# Patient Record
Sex: FEMALE | Race: WHITE | NOT HISPANIC OR LATINO | Employment: FULL TIME | URBAN - METROPOLITAN AREA
[De-identification: names, ages, dates, MRNs, and addresses within clinical notes are randomized per-mention and may not be internally consistent; named-entity substitution may affect disease eponyms.]

---

## 2017-08-08 ENCOUNTER — HOSPITAL ENCOUNTER (OUTPATIENT)
Dept: MAMMOGRAPHY | Facility: CLINIC | Age: 47
Discharge: HOME | End: 2017-08-08
Attending: OBSTETRICS & GYNECOLOGY | Admitting: OBSTETRICS & GYNECOLOGY
Payer: COMMERCIAL

## 2017-08-08 DIAGNOSIS — Z12.31 VISIT FOR SCREENING MAMMOGRAM: ICD-10-CM

## 2017-08-08 PROCEDURE — G0202 SCR MAMMO BI INCL CAD: HCPCS

## 2017-08-09 ENCOUNTER — HOSPITAL ENCOUNTER (OUTPATIENT)
Dept: ULTRASOUND IMAGING | Facility: CLINIC | Age: 47
End: 2017-08-09
Attending: OBSTETRICS & GYNECOLOGY
Payer: COMMERCIAL

## 2017-08-09 ENCOUNTER — HOSPITAL ENCOUNTER (OUTPATIENT)
Dept: MAMMOGRAPHY | Facility: CLINIC | Age: 47
Discharge: HOME | End: 2017-08-09
Attending: OBSTETRICS & GYNECOLOGY | Admitting: OBSTETRICS & GYNECOLOGY
Payer: COMMERCIAL

## 2017-08-09 DIAGNOSIS — R92.8 ABNORMAL MAMMOGRAM: ICD-10-CM

## 2017-08-09 PROCEDURE — G0279 TOMOSYNTHESIS, MAMMO: HCPCS

## 2017-08-09 PROCEDURE — 76642 ULTRASOUND BREAST LIMITED: CPT | Mod: LT

## 2017-12-23 ENCOUNTER — HOSPITAL ENCOUNTER (EMERGENCY)
Facility: CLINIC | Age: 47
Discharge: HOME | End: 2017-12-23
Attending: EMERGENCY MEDICINE | Admitting: EMERGENCY MEDICINE
Payer: COMMERCIAL

## 2017-12-23 ENCOUNTER — APPOINTMENT (OUTPATIENT)
Dept: GENERAL RADIOLOGY | Facility: CLINIC | Age: 47
End: 2017-12-23
Attending: EMERGENCY MEDICINE
Payer: COMMERCIAL

## 2017-12-23 VITALS
DIASTOLIC BLOOD PRESSURE: 65 MMHG | RESPIRATION RATE: 18 BRPM | OXYGEN SATURATION: 92 % | HEART RATE: 102 BPM | HEIGHT: 62 IN | BODY MASS INDEX: 29.44 KG/M2 | TEMPERATURE: 99.8 F | WEIGHT: 160 LBS | SYSTOLIC BLOOD PRESSURE: 100 MMHG

## 2017-12-23 DIAGNOSIS — R50.9 FEBRILE ILLNESS: ICD-10-CM

## 2017-12-23 DIAGNOSIS — J40 BRONCHITIS: ICD-10-CM

## 2017-12-23 LAB
ANION GAP SERPL CALCULATED.3IONS-SCNC: 11 MMOL/L (ref 3–14)
BUN SERPL-MCNC: 11 MG/DL (ref 7–30)
CALCIUM SERPL-MCNC: 8.5 MG/DL (ref 8.5–10.1)
CHLORIDE SERPL-SCNC: 104 MMOL/L (ref 94–109)
CO2 SERPL-SCNC: 22 MMOL/L (ref 20–32)
CREAT SERPL-MCNC: 0.82 MG/DL (ref 0.52–1.04)
ERYTHROCYTE [DISTWIDTH] IN BLOOD BY AUTOMATED COUNT: 12.4 % (ref 10–15)
GFR SERPL CREATININE-BSD FRML MDRD: 75 ML/MIN/1.7M2
GLUCOSE SERPL-MCNC: 105 MG/DL (ref 70–99)
HCT VFR BLD AUTO: 39.9 % (ref 35–47)
HGB BLD-MCNC: 13.4 G/DL (ref 11.7–15.7)
MCH RBC QN AUTO: 29.8 PG (ref 26.5–33)
MCHC RBC AUTO-ENTMCNC: 33.6 G/DL (ref 31.5–36.5)
MCV RBC AUTO: 89 FL (ref 78–100)
PLATELET # BLD AUTO: 204 10E9/L (ref 150–450)
POTASSIUM SERPL-SCNC: 3.4 MMOL/L (ref 3.4–5.3)
RBC # BLD AUTO: 4.5 10E12/L (ref 3.8–5.2)
SODIUM SERPL-SCNC: 137 MMOL/L (ref 133–144)
WBC # BLD AUTO: 7.4 10E9/L (ref 4–11)

## 2017-12-23 PROCEDURE — 25000132 ZZH RX MED GY IP 250 OP 250 PS 637: Performed by: EMERGENCY MEDICINE

## 2017-12-23 PROCEDURE — 85027 COMPLETE CBC AUTOMATED: CPT | Performed by: EMERGENCY MEDICINE

## 2017-12-23 PROCEDURE — 99284 EMERGENCY DEPT VISIT MOD MDM: CPT | Mod: 25

## 2017-12-23 PROCEDURE — 80048 BASIC METABOLIC PNL TOTAL CA: CPT | Performed by: EMERGENCY MEDICINE

## 2017-12-23 PROCEDURE — 71020 XR CHEST 2 VW: CPT

## 2017-12-23 RX ORDER — CETIRIZINE HYDROCHLORIDE 10 MG/1
10 TABLET ORAL EVERY MORNING
COMMUNITY

## 2017-12-23 RX ORDER — AZITHROMYCIN 250 MG/1
TABLET, FILM COATED ORAL
Qty: 6 TABLET | Refills: 0 | Status: SHIPPED | OUTPATIENT
Start: 2017-12-23 | End: 2017-12-28

## 2017-12-23 RX ORDER — IBUPROFEN 600 MG/1
600 TABLET, FILM COATED ORAL ONCE
Status: COMPLETED | OUTPATIENT
Start: 2017-12-23 | End: 2017-12-23

## 2017-12-23 RX ADMIN — IBUPROFEN 600 MG: 600 TABLET ORAL at 14:12

## 2017-12-23 ASSESSMENT — ENCOUNTER SYMPTOMS
SINUS PAIN: 1
FEVER: 1
COUGH: 1
SINUS PRESSURE: 1
FATIGUE: 1
VOMITING: 0
DIARRHEA: 0

## 2017-12-23 NOTE — ED AVS SNAPSHOT
[facility] Emergency Department    [address]    [facility] [address]    Phone:  [phone]    Fax:  [phone]                                       January Odonnell   MRN: [id number]    Department:  [facility] Emergency Department   Date of Visit:  12/23/2017           After Visit Summary Signature Page     I have received my discharge instructions, and my questions have been answered. I have discussed any challenges I see with this plan with the nurse or doctor.    ..........................................................................................................................................  Patient/Patient Representative Signature      ..........................................................................................................................................  Patient Representative Print Name and Relationship to Patient    ..................................................               ................................................  Date                                            Time    ..........................................................................................................................................  Reviewed by Signature/Title    ...................................................              ..............................................  Date                                                            Time

## 2017-12-23 NOTE — ED NOTES
Pt presents to ED with fever and cough. Had been seen in the beginning of December for cough and was being treated with prednisone and nebulizer. Went online to [employer] this AM and was prescribed mucinex, cefnidir, and tessalon pearls. She states she is concerned because the fever continues to increase. Taking ibuprofen and tylenol. Last tylenol 1 hr ago.

## 2017-12-23 NOTE — ED PROVIDER NOTES
History     Chief Complaint:    Fever and Cough      HPI   January Odonnell is a 47 year old female who presents to the ED today with a fever and a cough. The patient states that she was seen in Urgent Care about 2 weeks ago with a bad cough. She was diagnosed with acute bronchitis at the time and was prescribed Prednisone. She states that a few days later, she went in to see her primary care physician since she was not feeling any better and had some difficulty breathing. She was prescribed a nebulizer treatment, which seemed to help her symptoms. She states that earlier this week, she started to feel worse, with feeling more fatigued and noticed that her fever was high. She states that her highest noted fever was about 104.4. She reports to some sinus pressure, green and yellow colored mucus as well as some pressure in her ears, but denies any rashes, vomiting, diarrhea, or ear pain. 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 She reports that she had her flu shot earlier this year. The patient denies any history of lung problems.  She is a non-smoker. She reports that she has been taking Tylenol for her fevers, with her last dose being about an hour ago.     Allergies:  Amoxicillin   Ampicillin   Penicillins   Tetracycline      Medications:    Mucinex   Vermayst   Zyrtec     Past Medical History:    Allergic state     Past Surgical History:    History reviewed. No pertinent past surgical history.     Family History:    History reviewed. No pertinent family history.     Social History:  Marital Status:    Presents to the ED with    Tobacco Use: never smoker   Alcohol Use: yes   PCP: Frieda Saldaña     Review of Systems   Constitutional: Positive for fatigue and fever.   HENT: Positive for sinus pain and sinus pressure. Negative for ear pain.    Respiratory: Positive for cough.    Gastrointestinal: Negative for diarrhea and  "vomiting.   Skin: Negative for rash.   All other systems reviewed and are negative.      Physical Exam   First Vitals:  BP: (!) 119/96  Pulse: 102  Heart Rate: 102  Temp: 99.8  F (37.7  C)  Resp: 18  Height: 157.5 cm (5' 2\")  Weight: 72.6 kg (160 lb)  SpO2: 93 %      Physical Exam  General:                        Well-nourished                        Speaking in full sentences                        Intermittent dry, non-productive cough  Eyes:                        Conjunctiva without injection or scleral icterus                        PERRL  ENT:                        Moist mucous membranes                        Posterior oropharynx clear without erythema or exudate                        Nares patent                        Pinnae normal  Neck:                        Full ROM                        No stiffness appreciated  Resp:                        Lungs CTAB                        No crackles, wheezing or audible rubs                        Good air movement  CV:                                        Tachycardic rate, regular rhythm                        S1 and S2 present                        No murmur, gallop or rub  GI:                        BS present                        Abdomen soft without distention                        Non-tender to light and deep palpation                        No guarding or rebound tenderness  Skin:                        Warm, dry, well perfused                        No rashes or open wounds on exposed skin  MSK:                        Moves all extremities                        No focal deformities or swelling  Neuro:                        Alert                        Answers questions appropriately                        Moves all extremities equally                        Gait stable  Psych:                        Normal affect, normal mood    Emergency Department Course   Imaging:  Xray chest, PA and LAT  Normal.   Report per radiology.   Radiographic findings " were communicated with the patient who voiced understanding of the findings.    Laboratory:  CBC:  WBC 7.4, HGB 13.4, , otherwise WNL   BMP: glucose 105 (H), otherwise WNL (Creatinine 0.82)     Interventions:  (1412) Ibuprofen 600 mg, PO     Emergency Department Course:  Nursing notes and vitals reviewed.  (8642) I performed an exam of the patient as documented above.    The patient was sent for a chest xray while in the emergency department, findings above.    A peripheral IV was established. Blood was drawn from the patient. This was sent for laboratory testing, findings above.    (4392) I rechecked on the patient and updated them on results. The patient was feeling better at this time.    Findings and plan explained to the patient. Patient discharged home with instructions regarding supportive care, medications, and reasons to return. The importance of close follow-up was reviewed. The patient was prescribed azithromycin.   I personally reviewed the laboratory results with the patient and answered all related questions prior to discharge.     Impression & Plan    Medical Decision Making:  January Odonnell is a 47 year old female, previously healthy, presenting to the ED today for evaluation of fever and cough. Prior records are reviewed including cough and URI symptoms since earlier this month. Workup included imaging and laboratory studies. At present, symptoms are most suspicious for bronchitis. Chest xray was performed which was negative for acute infiltrate and pulmonary exam was clear. Given the duration of symptoms and development of fever and productive sputum, I have elected to cover with antibiotic at this time with concern for superimposed bacterial process. She has no history if underlying immunocompromising conditions, or known pulmonary disease including COPD. I have considered influenza, although given the duration of symptoms, she is outside the treatment window. Treatment at this point is  otherwise supportive and the patient is otherwise well appearing. This was discussed with the patient at length. I considered other bacterial infection such as meningitis, although clinically feel that this is unlikely. She is of normal mental status, demonstrates full range of motion around the neck and is otherwise nontoxic in appearance. After ibuprofen, she was noting improvement in symptoms. PE considered though felt unlikely given clear infectious symptoms of cough productive of sputum as well as sinus drainage. Results of the above studies were discussed with patient. CBC and BMP were unremarkable. Patient is felt stable for discharge home with close outpatient follow up and was recommended rest, fluids, tylenol and ibuprofen. Will start azithromycin for possible bacterial bronchitis. Return to ED with worsening shortness of breath, cough, or other concerning symptoms. All questions answered prior to discharge.     Diagnosis:    ICD-10-CM    1. Bronchitis J40    2. Febrile illness R50.9        Disposition:  discharged to home    Discharge Medications:  New Prescriptions    AZITHROMYCIN (ZITHROMAX Z-LUPILLO) 250 MG TABLET    Two tablets on the first day, then one tablet daily for the next 4 days         IBelle, am serving as a scribe on 12/23/2017 at 1:55 PM to personally document services performed by Dr. Peters based on my observations and the provider's statements to me.    12/23/2017   [facility] EMERGENCY DEPARTMENT       Tejas Peters MD  12/23/17 9585

## 2017-12-23 NOTE — ED AVS SNAPSHOT
[facility] Emergency Department    [address] [facility] [address]    Phone:  [phone]    Fax:  [phone]                                       January Odonnell   MRN: [id number]    Department:  [facility] Emergency Department   Date of Visit:  12/23/2017           Patient Information     Date Of Birth          1970        Your diagnoses for this visit were:     Bronchitis     Febrile illness        You were seen by Tejas Peters MD.      Follow-up Information     Follow up with Frieda Saldaña MD. Schedule an appointment as soon as possible for a visit in 2 days.    Specialty:  OB/Gyn    Contact information:    [facility]  [address]   [facility] [address] [phone]          Follow up with [facility] Emergency Department.    Specialty:  EMERGENCY MEDICINE    Why:  If symptoms worsen    Contact information:    [address] [facility] [address] [phone]        Discharge Instructions       Discharge Instructions  Bronchitis, Pneumonia, Bronchospasm    You were seen today for a chest infection or inflammation. If your provider decided this was due to a bacterial infection, you may need an antibiotic. Sometimes these are caused by a virus, and then an antibiotic will not help.     Generally, every Emergency Department visit should have a follow-up clinic visit with either a primary or a specialty clinic/provider. Please follow-up as instructed by your emergency provider today.    Return to the Emergency Department if:    Your breathing gets much worse.    You are very weak, or feel much more ill.    You develop new symptoms, such as chest pain.    You cough up blood.    You are vomiting (throwing up) enough that you cannot keep fluids or your medicine down.    What can I do to help myself?    Fill any prescriptions the provider gave you and take them right away--especially antibiotics. Be sure to finish  the whole antibiotic prescription.    You may be given a prescription for an inhaler, which can help loosen tight air passages.  Use this as needed, but not more often than directed. Inhalers work much better when used with a spacer.     You may be given a prescription for a steroid to reduce inflammation. Used long-term, these can have side effects, but for short-term use they are safe. You may notice restlessness or increased appetite.        You may use non-prescription cough or cold medicines. Cough medicines may help, but don t make the cough go away completely.     Avoid smoke, because this can make your symptoms worse. If you smoke, this may be a good time to quit! Consider using nicotine lozenges, gum, or patches to reduce cravings.     If you have a fever, Tylenol  (acetaminophen), Motrin  (ibuprofen), or Advil  (ibuprofen) may help bring fever down and may help you feel more comfortable. Be sure to read and follow the package directions, and ask your provider if you have questions.    Be sure to get your flu shot each year.  For certain ages, the pneumonia shot can help prevent pneumonia.  If you were given a prescription for medicine here today, be sure to read all of the information (including the package insert) that comes with your prescription.  This will include important information about the medicine, its side effects, and any warnings that you need to know about.  The pharmacist who fills the prescription can provide more information and answer questions you may have about the medicine.  If you have questions or concerns that the pharmacist cannot address, please call or return to the Emergency Department.     Remember that you can always come back to the Emergency Department if you are not able to see your regular provider in the amount of time listed above, if you get any new symptoms, or if there is anything that worries you.      24 Hour Appointment Hotline       To make an appointment at any  [facility], call [phone] ([phone]). If you don't have a family doctor or clinic, we will help you find one. [place] clinics are conveniently located to serve the needs of you and your family.             Review of your medicines      START taking        Dose / Directions Last dose taken    azithromycin 250 MG tablet   Commonly known as:  ZITHROMAX Z-LUPILLO   Quantity:  6 tablet        Two tablets on the first day, then one tablet daily for the next 4 days   Refills:  0          Our records show that you are taking the medicines listed below. If these are incorrect, please call your family doctor or clinic.        Dose / Directions Last dose taken    cetirizine 10 MG tablet   Commonly known as:  zyrTEC   Dose:  10 mg        Take 10 mg by mouth daily   Refills:  0        dextromethorphan-guaiFENesin  MG per 12 hr tablet   Commonly known as:  MUCINEX DM   Dose:  1 tablet        Take 1 tablet by mouth every 12 hours   Refills:  0        fluticasone 27.5 MCG/SPRAY spray   Commonly known as:  VERAMYST   Dose:  2 spray        Spray 2 sprays into both nostrils daily   Refills:  0                Prescriptions were sent or printed at these locations (1 Prescription)                   Other Prescriptions                Printed at Department/Unit printer (1 of 1)         azithromycin (ZITHROMAX Z-LUPILLO) 250 MG tablet                Procedures and tests performed during your visit     Basic metabolic panel (BMP)    CBC (platelets, no diff)    Chest XR,  PA & LAT      Orders Needing Specimen Collection     None      Pending Results     No orders found from 12/21/2017 to 12/24/2017.            Pending Culture Results     No orders found from 12/21/2017 to 12/24/2017.            Pending Results Instructions     If you had any lab results that were not finalized at the time of your Discharge, you can call the ED Lab Result RN at [phone]. You will be contacted by this team for any positive Lab results or  changes in treatment. The nurses are available 7 days a week from 10A to 6:30P.  You can leave a message 24 hours per day and they will return your call.        Test Results From Your Hospital Stay        12/23/2017  2:17 PM      Component Results     Component Value Ref Range & Units Status    WBC 7.4 4.0 - 11.0 10e9/L Final    RBC Count 4.50 3.8 - 5.2 10e12/L Final    Hemoglobin 13.4 11.7 - 15.7 g/dL Final    Hematocrit 39.9 35.0 - 47.0 % Final    MCV 89 78 - 100 fl Final    MCH 29.8 26.5 - 33.0 pg Final    MCHC 33.6 31.5 - 36.5 g/dL Final    RDW 12.4 10.0 - 15.0 % Final    Platelet Count 204 150 - 450 10e9/L Final         12/23/2017  2:33 PM      Component Results     Component Value Ref Range & Units Status    Sodium 137 133 - 144 mmol/L Final    Potassium 3.4 3.4 - 5.3 mmol/L Final    Chloride 104 94 - 109 mmol/L Final    Carbon Dioxide 22 20 - 32 mmol/L Final    Anion Gap 11 3 - 14 mmol/L Final    Glucose 105 (H) 70 - 99 mg/dL Final    Urea Nitrogen 11 7 - 30 mg/dL Final    Creatinine 0.82 0.52 - 1.04 mg/dL Final    GFR Estimate 75 >60 mL/min/1.7m2 Final    Non  GFR Calc    GFR Estimate If Black >90 >60 mL/min/1.7m2 Final    African American GFR Calc    Calcium 8.5 8.5 - 10.1 mg/dL Final         12/23/2017  2:41 PM      Narrative     CHEST TWO VIEWS  12/23/2017 2:35 PM     HISTORY: Cough and fever.    COMPARISON: None.        Impression     IMPRESSION: Normal.    BUD AMBROSIO MD                Clinical Quality Measure: Blood Pressure Screening     Your blood pressure was checked while you were in the emergency department today. The last reading we obtained was  BP: (!) 119/96 . Please read the guidelines below about what these numbers mean and what you should do about them.  If your systolic blood pressure (the top number) is less than 120 and your diastolic blood pressure (the bottom number) is less than 80, then your blood pressure is normal. There is nothing more that you need to do about  "it.  If your systolic blood pressure (the top number) is 120-139 or your diastolic blood pressure (the bottom number) is 80-89, your blood pressure may be higher than it should be. You should have your blood pressure rechecked within a year by a primary care provider.  If your systolic blood pressure (the top number) is 140 or greater or your diastolic blood pressure (the bottom number) is 90 or greater, you may have high blood pressure. High blood pressure is treatable, but if left untreated over time it can put you at risk for heart attack, stroke, or kidney failure. You should have your blood pressure rechecked by a primary care provider within the next 4 weeks.  If your provider in the emergency department today gave you specific instructions to follow-up with your doctor or provider even sooner than that, you should follow that instruction and not wait for up to 4 weeks for your follow-up visit.        Thank you for choosing [facility]       Thank you for choosing [facility] for your care. Our goal is always to provide you with excellent care. Hearing back from our patients is one way we can continue to improve our services. Please take a few minutes to complete the written survey that you may receive in the mail after you visit with us. Thank you!        [facility] Information     [employer] lets you send messages to your doctor, view your test results, renew your prescriptions, schedule appointments and more. To sign up, go to [email or web address] . Click on \"Log in\" on the left side of the screen, which will take you to the Welcome page. Then click on \"Sign up Now\" on the right side of the page.     You will be asked to enter the access code listed below, as well as some personal information. Please follow the directions to create your username and password.     Your access code is: [email or web address]  Expires: 3/23/2018  3:19 PM     Your access code will  in 90 days. If you need help or a new code, please call " your [facility] clinic or [phone].        Care EveryWhere ID     This is your Care EveryWhere ID. This could be used by other organizations to access your [facility] medical records  [id number]        Equal Access to Services     DAVID VICTORIA : Dilcia Whitley, warachaelda luqadaha, qaybta kaalmada quoc, andrea olmedo. So [facility] [phone].    ATENCIÓN: Si habla español, tiene a lemus disposición servicios gratuitos de asistencia lingüística. Llame al [phone].    We comply with applicable federal civil rights laws and [place] laws. We do not discriminate on the basis of race, color, national origin, age, disability, sex, sexual orientation, or gender identity.            After Visit Summary       This is your record. Keep this with you and show to your community pharmacist(s) and doctor(s) at your next visit.

## 2018-11-29 ENCOUNTER — TRANSFERRED RECORDS (OUTPATIENT)
Dept: HEALTH INFORMATION MANAGEMENT | Facility: CLINIC | Age: 48
End: 2018-11-29

## 2019-06-17 ENCOUNTER — HOSPITAL ENCOUNTER (OUTPATIENT)
Dept: MAMMOGRAPHY | Facility: CLINIC | Age: 49
Discharge: HOME | End: 2019-06-17
Attending: OBSTETRICS & GYNECOLOGY | Admitting: OBSTETRICS & GYNECOLOGY
Payer: COMMERCIAL

## 2019-06-17 DIAGNOSIS — Z12.31 VISIT FOR SCREENING MAMMOGRAM: ICD-10-CM

## 2019-06-17 PROCEDURE — 77063 BREAST TOMOSYNTHESIS BI: CPT

## 2021-06-23 ENCOUNTER — TRANSFERRED RECORDS (OUTPATIENT)
Dept: HEALTH INFORMATION MANAGEMENT | Facility: CLINIC | Age: 51
End: 2021-06-23

## 2021-06-24 ENCOUNTER — MEDICAL CORRESPONDENCE (OUTPATIENT)
Dept: SURGERY | Facility: CLINIC | Age: 51
End: 2021-06-24

## 2021-06-24 ENCOUNTER — TELEPHONE (OUTPATIENT)
Dept: SURGERY | Facility: CLINIC | Age: 51
End: 2021-06-24

## 2021-06-24 ENCOUNTER — CARE COORDINATION (OUTPATIENT)
Dept: SURGERY | Facility: CLINIC | Age: 51
End: 2021-06-24

## 2021-06-24 DIAGNOSIS — C50.912 MALIGNANT NEOPLASM OF LEFT BREAST (H): Primary | ICD-10-CM

## 2021-06-24 NOTE — TELEPHONE ENCOUNTER
Call placed to January at request of Dr. Estrella. Left message requesting call back.    Joan De La Torre RN, BSN, OCN, CBCN  Breast Nurse Navigator  [facility]  Phone: [phone]

## 2021-06-24 NOTE — TELEPHONE ENCOUNTER
January notified of the followin.) Diagnostic breast imaging tomorrow () at [facility], checking in at 12:45 pm.    2.) Surgical consultation with Dr. Dyan Vela on Tuesday () checking in at 8:15 am at [facility] [place].    January verbalized understanding to above.    Joan De La Torre RN, BSN, OCN, CBCN  Breast Nurse Navigator  [facility]  Phone: [phone]

## 2021-06-25 ENCOUNTER — HOSPITAL ENCOUNTER (OUTPATIENT)
Dept: MAMMOGRAPHY | Facility: CLINIC | Age: 51
End: 2021-06-25
Attending: INTERNAL MEDICINE
Payer: MEDICAID

## 2021-06-25 DIAGNOSIS — C50.912 MALIGNANT NEOPLASM OF LEFT BREAST (H): ICD-10-CM

## 2021-06-25 PROCEDURE — 88305 TISSUE EXAM BY PATHOLOGIST: CPT | Mod: 26 | Performed by: PATHOLOGY

## 2021-06-25 PROCEDURE — 38505 NEEDLE BIOPSY LYMPH NODES: CPT

## 2021-06-25 PROCEDURE — 88360 TUMOR IMMUNOHISTOCHEM/MANUAL: CPT | Mod: TC | Performed by: INTERNAL MEDICINE

## 2021-06-25 PROCEDURE — 88307 TISSUE EXAM BY PATHOLOGIST: CPT | Mod: 26 | Performed by: PATHOLOGY

## 2021-06-25 PROCEDURE — 77062 BREAST TOMOSYNTHESIS BI: CPT

## 2021-06-25 PROCEDURE — 88377 M/PHMTRC ALYS ISHQUANT/SEMIQ: CPT | Mod: TC | Performed by: PATHOLOGY

## 2021-06-25 PROCEDURE — 999N000065 MA POST PROCEDURE LEFT

## 2021-06-25 PROCEDURE — 76642 ULTRASOUND BREAST LIMITED: CPT | Mod: LT

## 2021-06-25 PROCEDURE — 88305 TISSUE EXAM BY PATHOLOGIST: CPT | Mod: TC | Performed by: INTERNAL MEDICINE

## 2021-06-25 PROCEDURE — 88307 TISSUE EXAM BY PATHOLOGIST: CPT | Mod: TC | Performed by: INTERNAL MEDICINE

## 2021-06-25 PROCEDURE — 999N001019 HC STATISTIC H-FISH PROCESS B/S: Performed by: INTERNAL MEDICINE

## 2021-06-25 PROCEDURE — 88360 TUMOR IMMUNOHISTOCHEM/MANUAL: CPT | Mod: 26 | Performed by: PATHOLOGY

## 2021-06-25 PROCEDURE — 250N000009 HC RX 250: Performed by: INTERNAL MEDICINE

## 2021-06-25 PROCEDURE — 88377 M/PHMTRC ALYS ISHQUANT/SEMIQ: CPT | Mod: 26 | Performed by: MEDICAL GENETICS

## 2021-06-25 PROCEDURE — 272N000031 US BREAST BIOPSY CORE NEEDLE LEFT

## 2021-06-25 PROCEDURE — 999N001020 HC STATISTIC H-SEND OUTS PREP: Performed by: INTERNAL MEDICINE

## 2021-06-25 RX ADMIN — LIDOCAINE HYDROCHLORIDE 10 ML: 10 INJECTION, SOLUTION INFILTRATION; PERINEURAL at 14:09

## 2021-06-25 NOTE — DISCHARGE INSTRUCTIONS

## 2021-06-25 NOTE — DISCHARGE INSTRUCTIONS

## 2021-06-27 NOTE — PROGRESS NOTES
[facility] Breast Surgery Consultation    HPI:   January Odonnell is a 50 year old female who is seen in consultation at the request of Dr. Estrella for evaluation of newly diagnosed left breast invasive ductal carcinoma, grade 2-3 with LVI at 7:00, 4cm FN measuring up to 9cm on imaging with left axillary node biopsied and positive for metastatic disease. All receptors currently pending.     She reports she felt a lump in her left lower inner breast in early June. 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 she saw an MD there who ordered US and biopsy which did reveal invasive ductal carcinoma. No receptors were evaluated. [place].     She then saw Dr. Estrella and had diagnostic imaging on 6/25/2021t. Her imaging revealed a 2.1cm oval mass deep to the marker with smaller masses and architectrual distortion throughout the medial left breast with extent of 9cm. There is associated skin retraction and a prominent lymph node in the axilla. On US there were several oval hypoechoic masses with indistinct margins at 7:00, 4cm FN, largest measured 1.7cm and ill defined hypoechogenicity extending toward the nipple from the primary mass. Left axillary US revealed a 1.9cm enlarged lymph node. She had biopsy of the lesion in the breast at 7:00, 4cm FN and the axillary node.       Hormonal history:  menarche 14, no children, tried some infertility tx but told poor egg quality, no IVF completed,  Gabriella-menopausal, 4 years OCP use - had been on Juveil OCP and off now as of last week, no HRT, no fertility treatment.     Family history of breast cancer: Yes - paternal grandmother  Family history of ovarian cancer:  No  Family history of colon cancer: No  Family history of prostate cancer: Yes - maternal grandfather      Past Medical History:   has a past medical history of Allergic state.      Current Outpatient Medications:      cetirizine (ZYRTEC) 10 MG  tablet, Take 10 mg by mouth daily, Disp: , Rfl:      dextromethorphan-guaiFENesin (MUCINEX DM)  MG per 12 hr tablet, Take 1 tablet by mouth every 12 hours, Disp: , Rfl:      fluticasone (VERAMYST) 27.5 MCG/SPRAY spray, Spray 2 sprays into both nostrils daily, Disp: , Rfl:     Past Surgical History:  No past surgical history on file.        Allergies   Allergen Reactions     Amoxicillin      Ampicillin      Penicillins      Tetracycline         Social History:  Social History     Socioeconomic History     Marital status:      Spouse name: Not on file     Number of children: Not on file     Years of education: Not on file     Highest education level: Not on file   Occupational History     Not on file   Social Needs     Financial resource strain: Not on file     Food insecurity     Worry: Not on file     Inability: Not on file     Transportation needs     Medical: Not on file     Non-medical: Not on file   Tobacco Use     Smoking status: Never Smoker     Smokeless tobacco: Never Used   Substance and Sexual Activity     Alcohol use: Yes     Comment: rarely     Drug use: No     Sexual activity: Not on file   Lifestyle     Physical activity     Days per week: Not on file     Minutes per session: Not on file     Stress: Not on file   Relationships     Social connections     Talks on phone: Not on file     Gets together: Not on file     Attends Rastafari service: Not on file     Active member of club or organization: Not on file     Attends meetings of clubs or organizations: Not on file     Relationship status: Not on file     Intimate partner violence     Fear of current or ex partner: Not on file     Emotionally abused: Not on file     Physically abused: Not on file     Forced sexual activity: Not on file   Other Topics Concern     Not on file   Social History Narrative     Not on file        ROS:  The 10 point review of systems is negative other than noted in the HPI and above.    PE:  Vitals: /82 (BP  "Location: Left arm, Patient Position: Sitting, Cuff Size: Adult Regular)   Pulse 72   Ht 1.575 m (5' 2\")   Wt 73.5 kg (162 lb)   SpO2 96%   BMI 29.63 kg/m    General appearance: well-nourished, sitting comfortably, no apparent distress  Psych: normal affect, pleasant  HEENT:  Head normocephalic and atraumatic, pupils equal and round, conjunctivae clear, mucous membranes moist, external ears and nose normal  Neck: Supple without thyromegaly or masses  Lungs: Respirations unlabored  Lymphatic: No cervical, or supraclavicular lymphadenopathy  Extremities: Without edema  Musculoskeletal:  Normal station and gait  Neurologic: nonfocal, grossly intact times four extremities, alert and oriented times three  Psychiatric: Mood and affect are appropriate  Skin: Without lesions or rashes    Breast:  A bilateral breast exam was performed in the supine position.. Bilateral breasts were palpated in a circumferential clockwise fashion including the supraclavicular and axillary areas.   The skin on both breasts is normal. Nipples everted. There is some mild ecchymosis on the left lower inner breast and in the left axilla. There is an easily palpable mass on the left lower inner breast which is 5-6cm on physical exam with increased surrounding breast density as well as increased density centrally and in the upper outer breast. Right breast is heterogeneously dense. No palpable masses    Lymph:       No supraclavicular/infraclavicular adenopathy.   Axillary adenopathy: left - single palpable node in mid axilla    Assessment:  left breast invasive ductal carcinoma, grade 2-3 with LVI at 7:00, 4cm FN measuring up to 9cm on imaging with left axillary node biopsied and positive for metastatic disease. All receptors currently pending.       Plan:   January Odonnell is a 50 year old female has newly diagnosed left breast cancer.  I reviewed the imaging and pathology reports with her and her mother and explained the findings.  We " talked about the fact that this is invasive ductal carcinoma  that is large in size..   We discussed the receptor status as all receptors are currently pending. We will call as we get these results in. We discussed implications of various receptor scenarios. We discussed that breast cancer is treated in a multidisciplinary fashion and she has already met with Dr. Estrella. We discussed with her kiana positivity and size of cancer, I would likely favor neoadjuvant chemotherapy regardless of receptor status; however, if hormone positive, HER 2 negative and if MRI does not reveal extensive adenopathy, surgery first could be entertained. We discussed that we may elect to get an oncotype on the tumor to help with this decision.     We next discussed the surgical options for treatment.  I described the procedures for lumpectomy with sentinel lymph node biopsy and mastectomy with sentinel lymph node biopsy, possible axillary node dissection including the details of the procedures, the risks, anesthesia and expected recovery.  She is not a candidate for lumpectomy. We discussed the recommendation for axillary node dissection in someone with biopsy proven metastatic disease; however, if only 1 node of concern on MRI, would still do SLNB. Goal of neoadjuvant chemo is also to reduce need for extensive kiana dissection and make her a candidate for SLNB.       We talked about post-lumpectomy radiation, the course and usual side effects. We discussed that with lumpectomy, radiation is typically recommended to decrease risk of recurrence. It may be necessary following mastectomy depending on final pathology and if kiana involvement is present. I would recommend radiation for her following mastectomy due to size and kiana involvement.       We also talked about post-mastectomy reconstruction and the stages involved. We also discussed the various types of mastectomy, including total, skin-sparing, and nipple-sparing mastectomy.     The option of having immediate versus delayed reconstruction was also discussed.   We reviewed that the advantages of immediate reconstruction includes superior cosmetics, as the skin is preserved. She is not interested in reconstruction at this time.      In addition, I have recommended genetic counseling.  Dr. Estrella has already placed a referral.     Plan:   Breast MRI  Await receptor results  Finalize plan of neoadjuvant chemotherapy vs surgery first  Possible port placement    60 minutes total time spent on the date of this encounter doing: chart review, review of test results, patient visit, physical exam, education, counseling, developing plan of care, and documenting.    Dyan Vela MD      Please route or send letter to:  Primary Care Provider (PCP) and Referring Provider

## 2021-06-28 ENCOUNTER — TELEPHONE (OUTPATIENT)
Dept: SURGERY | Facility: CLINIC | Age: 51
End: 2021-06-28

## 2021-06-28 NOTE — TELEPHONE ENCOUNTER
Call placed to January with pathology results from left breast biopsy. Left message requesting call back. No phi left on voicemail.    Joan De La Torre RN, BSN, OCN, CBCN  Breast Nurse Navigator  [facility]  Phone: [phone]

## 2021-06-29 ENCOUNTER — OFFICE VISIT (OUTPATIENT)
Dept: SURGERY | Facility: CLINIC | Age: 51
End: 2021-06-29
Payer: MEDICAID

## 2021-06-29 VITALS
OXYGEN SATURATION: 96 % | WEIGHT: 162 LBS | HEIGHT: 62 IN | SYSTOLIC BLOOD PRESSURE: 126 MMHG | BODY MASS INDEX: 29.81 KG/M2 | DIASTOLIC BLOOD PRESSURE: 82 MMHG | HEART RATE: 72 BPM

## 2021-06-29 DIAGNOSIS — C50.312 MALIGNANT NEOPLASM OF LOWER-INNER QUADRANT OF LEFT FEMALE BREAST, UNSPECIFIED ESTROGEN RECEPTOR STATUS (H): Primary | ICD-10-CM

## 2021-06-29 PROCEDURE — 99205 OFFICE O/P NEW HI 60 MIN: CPT | Performed by: SURGERY

## 2021-06-29 ASSESSMENT — MIFFLIN-ST. JEOR: SCORE: 1313.08

## 2021-06-29 NOTE — NURSING NOTE
Breast Patients    BREAST PATIENTS (ALL)    1-Do you have any of the following symptoms? Lump(s) or Mass(es)  2-In which breast are you having the symptoms? left  3-Have you had a Mammogram? Lazarus Edwards - Date:  6/25/21  4-Have you ever had a breast cyst drained? No  5-Have you ever had a breast biopsy? Yes:  Left   -   Date:  6/25/21  6-Have you ever had a Breast Cancer? Yes:  Left   -   Date:  6/25/21   7-Is there a history of Breast Cancer in your family? Yes   Relationship to you:    Grandmother  8-Have you ever had Ovarian Cancer? No  9-Is there a history of Ovarian Cancer in your family? Yes   Relationship to you:    Mother's aunt  10-Summarize your caffeine intake (i.e. coffee, tea, chocolate, soda etc.): 1 cup of tea a day    BREAST PATIENTS (FEMALE)    11-What age did your periods begin? 14  12-Date your last menstrual period began? 6/27  13-Number of full-term pregnancies: 0  14-Your age when your first child was born? N/A  15-Did you nurse your children? N/A  16-Are you pregnant now? No  17-Have you begun menopause? Yes  Age Menopause began:  47  18-Have you had either ovary removed?No  19-Do you have breast implants? No   20-Do you use hormone replacement therapy?  Yes  Type: Junel  Dosage: 10mg  21-Have you taken oral contraceptive pills?  Yes, For how many years?  4   22-Have you had an intrauterine device (IUD) placed?  No  23-What is your current bra size?  DD

## 2021-06-30 ENCOUNTER — TRANSFERRED RECORDS (OUTPATIENT)
Dept: HEALTH INFORMATION MANAGEMENT | Facility: CLINIC | Age: 51
End: 2021-06-30

## 2021-07-02 LAB — COPATH REPORT: NORMAL

## 2021-07-03 LAB — COPATH REPORT: NORMAL

## 2021-07-05 ENCOUNTER — TELEPHONE (OUTPATIENT)
Dept: SURGERY | Facility: CLINIC | Age: 51
End: 2021-07-05

## 2021-07-05 NOTE — TELEPHONE ENCOUNTER
January notified of HER2 results:    INTERPRETATION:   Because the results of the FISH analysis of Block A1 were classified as   Group 4 per the American Society of   Clinical Oncology/College of American Pathologists Clinical Practice   Guideline Focused Update (Kenneth AC et al,   2018, Arch Pathol Lab Med  142:1364; doi:[email or web address]-[id number]),   further immunohistochemical (IHC)   analysis was necessary.     Per report ([id number]), the IHC result on Block A1 was interpreted as   Negative (score 0). Taken together, the   FISH and IHC results for Block A1 are interpreted as HER2 Negative     Proceed with Breast MRI as scheduled. January verbalized understanding.    Joan De La Torre RN, BSN, OCN, CBCN  Breast Nurse Navigator  [facility]  Phone: [phone]

## 2021-07-06 ENCOUNTER — HOSPITAL ENCOUNTER (OUTPATIENT)
Dept: MRI IMAGING | Facility: CLINIC | Age: 51
Discharge: HOME | End: 2021-07-06
Attending: SURGERY | Admitting: SURGERY
Payer: MEDICAID

## 2021-07-06 ENCOUNTER — TRANSFERRED RECORDS (OUTPATIENT)
Dept: HEALTH INFORMATION MANAGEMENT | Facility: CLINIC | Age: 51
End: 2021-07-06

## 2021-07-06 DIAGNOSIS — C50.312 MALIGNANT NEOPLASM OF LOWER-INNER QUADRANT OF LEFT FEMALE BREAST, UNSPECIFIED ESTROGEN RECEPTOR STATUS (H): ICD-10-CM

## 2021-07-06 PROCEDURE — 255N000002 HC RX 255 OP 636: Performed by: SURGERY

## 2021-07-06 PROCEDURE — 77049 MRI BREAST C-+ W/CAD BI: CPT

## 2021-07-06 PROCEDURE — A9585 GADOBUTROL INJECTION: HCPCS | Performed by: SURGERY

## 2021-07-06 RX ORDER — GADOBUTROL 604.72 MG/ML
7 INJECTION INTRAVENOUS ONCE
Status: COMPLETED | OUTPATIENT
Start: 2021-07-06 | End: 2021-07-06

## 2021-07-06 RX ADMIN — GADOBUTROL 7 ML: 604.72 INJECTION INTRAVENOUS at 13:27

## 2021-07-07 ENCOUNTER — PREP FOR PROCEDURE (OUTPATIENT)
Dept: SURGERY | Facility: PHYSICIAN GROUP | Age: 51
End: 2021-07-07

## 2021-07-07 ENCOUNTER — TRANSFERRED RECORDS (OUTPATIENT)
Dept: HEALTH INFORMATION MANAGEMENT | Facility: CLINIC | Age: 51
End: 2021-07-07
Payer: MEDICAID

## 2021-07-07 ENCOUNTER — TELEPHONE (OUTPATIENT)
Dept: SURGERY | Facility: PHYSICIAN GROUP | Age: 51
End: 2021-07-07

## 2021-07-07 DIAGNOSIS — C50.212 MALIGNANT NEOPLASM OF UPPER-INNER QUADRANT OF LEFT BREAST IN FEMALE, ESTROGEN RECEPTOR POSITIVE (H): Primary | ICD-10-CM

## 2021-07-07 DIAGNOSIS — N63.10 BREAST MASS, RIGHT: Primary | ICD-10-CM

## 2021-07-07 DIAGNOSIS — C50.312 MALIGNANT NEOPLASM OF LOWER-INNER QUADRANT OF LEFT FEMALE BREAST, UNSPECIFIED ESTROGEN RECEPTOR STATUS (H): Primary | ICD-10-CM

## 2021-07-07 DIAGNOSIS — Z17.0 MALIGNANT NEOPLASM OF UPPER-INNER QUADRANT OF LEFT BREAST IN FEMALE, ESTROGEN RECEPTOR POSITIVE (H): Primary | ICD-10-CM

## 2021-07-07 DIAGNOSIS — Z11.59 ENCOUNTER FOR SCREENING FOR OTHER VIRAL DISEASES: ICD-10-CM

## 2021-07-07 NOTE — TELEPHONE ENCOUNTER
Type of surgery: Bilateral mastectomy with seed localized left axillary node excision, left sentinel lymph node biopsy, possible axillary node dissection, possible right sentinel lymph node biopsy  Location of surgery: [facility]  Date and time of surgery: 7/16/21 at 1pm  Surgeon: Dr. Dyan Vela  Pre-Op Appt Date: Patient to schedule  Post-Op Appt Date: Patient to schedule   Packet sent out: Yes  Pre-cert/Authorization completed:  Not Applicable  Date: 7/7/21

## 2021-07-07 NOTE — TELEPHONE ENCOUNTER
I called January and discussed her MRI results. It revealed an 8.5cm area of mass and nonmass enhancement in the left breast as well as two nodes which are concerning. The biopsied node and one additional. There is a small area of enhancement in the right breast and US and biopsy is recommended. I discussed her care with Dr. Estrella and we agree that surgery first given minimal kiana involvement is reasonable and would plan to check an oncotype if 1-3 nodes positive. Plan for bilateral mastectomies with seed localized left axillary node excision and SLNB on the left, possible right SLNB.     Dyan Vela MD  [facility]  [phone]

## 2021-07-11 ENCOUNTER — HEALTH MAINTENANCE LETTER (OUTPATIENT)
Age: 51
End: 2021-07-11

## 2021-07-12 DIAGNOSIS — Z11.59 ENCOUNTER FOR SCREENING FOR OTHER VIRAL DISEASES: ICD-10-CM

## 2021-07-12 PROCEDURE — U0005 INFEC AGEN DETEC AMPLI PROBE: HCPCS

## 2021-07-13 ENCOUNTER — HOSPITAL ENCOUNTER (OUTPATIENT)
Dept: MAMMOGRAPHY | Facility: CLINIC | Age: 51
End: 2021-07-13
Attending: SURGERY
Payer: MEDICAID

## 2021-07-13 ENCOUNTER — TELEPHONE (OUTPATIENT)
Dept: SURGERY | Facility: CLINIC | Age: 51
End: 2021-07-13

## 2021-07-13 ENCOUNTER — PREP FOR PROCEDURE (OUTPATIENT)
Dept: SURGERY | Facility: CLINIC | Age: 51
End: 2021-07-13

## 2021-07-13 ENCOUNTER — MEDICAL CORRESPONDENCE (OUTPATIENT)
Dept: HEALTH INFORMATION MANAGEMENT | Facility: CLINIC | Age: 51
End: 2021-07-13

## 2021-07-13 DIAGNOSIS — Z17.0 MALIGNANT NEOPLASM OF UPPER-INNER QUADRANT OF LEFT BREAST IN FEMALE, ESTROGEN RECEPTOR POSITIVE (H): Primary | ICD-10-CM

## 2021-07-13 DIAGNOSIS — C50.212 MALIGNANT NEOPLASM OF UPPER-INNER QUADRANT OF LEFT BREAST IN FEMALE, ESTROGEN RECEPTOR POSITIVE (H): Primary | ICD-10-CM

## 2021-07-13 DIAGNOSIS — N63.10 BREAST MASS, RIGHT: ICD-10-CM

## 2021-07-13 LAB — SARS-COV-2 RNA RESP QL NAA+PROBE: NEGATIVE

## 2021-07-13 PROCEDURE — 76642 ULTRASOUND BREAST LIMITED: CPT | Mod: RT

## 2021-07-13 NOTE — TELEPHONE ENCOUNTER
January called with questions about her upcoming breast surgery. All January's questions answered appropriately and thoroughly. January knows to contact us in the interim with additional questions. I will also reach out to her the day before her procedure to review last minute details.     Joan De La Torre RN, BSN, OCN, CBCN  Breast Nurse Navigator  [facility]  Phone: [phone]

## 2021-07-13 NOTE — TELEPHONE ENCOUNTER
January presented to the Breast Center today for her right breast ultrasound MRI follow-up. Unfortunately, the right breast lesion seen on MRI was not visualized on ultrasound. MRI guided biopsy of right breast lesion is recommended.    Above reviewed with Dr. Vela. Per Dr. Vela, January has two options: 1.) we can keep surgery as scheduled and proceed with Right SLNB at time of surgery or 2.) we can cancel surgery and arrange for MRI guided biopsy of right breast lesion.    I reviewed the above options with January. Her  is here from [place] and she would really like to keep her surgery as scheduled. She had multiple questions about the right SLNB and I answered them all to the best of my ability.     We will proceed with surgery as scheduled for Friday. Surgery orders will be updated to reflect Right SLNB. January verbalized understanding.    Joan De La Torre RN, BSN, OCN, CBCN  Breast Nurse Navigator  [facility]  Phone: [phone]

## 2021-07-15 ENCOUNTER — TELEPHONE (OUTPATIENT)
Dept: SURGERY | Facility: CLINIC | Age: 51
End: 2021-07-15

## 2021-07-15 RX ORDER — FLUTICASONE PROPIONATE 50 MCG
1 SPRAY, SUSPENSION (ML) NASAL EVERY MORNING
COMMUNITY

## 2021-07-15 NOTE — TELEPHONE ENCOUNTER
January is scheduled for bilateral mastectomy, seed localized left axillary node excision, bilateral sentinel lymph node biopsy with Dr. Vela on 7/16/2021. Pre procedure call placed to patient.       Ref Range & Units 3 d ago    SARS CoV2 PCR Negative Negative    Comment: NEGATIVE: SARS-CoV-2 (COVID-19) RNA not detected, presumed negative.     Okay to proceed with surgery as scheduled.    The following are our updated visitor restrictions:   -Adult surgical patients can have one visitor only before surgery. Once the surgery starts, your visitor will be asked to leave the hospital. They may come back to visit you if you are transferred to the floor within visitation hours.   -Visitation hours for adult inpatients are 8 a.m. to 8:30 p.m.  -One visitor is allowed for hospital visits, clinic appointments, and emergency department visits. It must be the same individual for the patient s entire stay.  -One or two visitors may attend a patient discharge meeting for hands-on training.  -Please wear a mask to your appointment.   -Please arrive at 8:45 am at the Breast Center day of your procedure.    January verbalized understanding to above.    Joan De La Torre RN, BSN, OCN, CBCN  Breast Nurse Navigator  [facility]  Phone: [phone]

## 2021-07-15 NOTE — PROGRESS NOTES
PTA medications updated by Medication Scribe prior to surgery via phone call with patient (last doses completed by Nurse)     Medication history sources: Patient, Surescripts and H&P  In the past week, patient estimated taking medication this percent of the time: Greater than 90%  Adherence assessment: N/A Not Observed    Significant changes made to the medication list:  None      Additional medication history information:   None    Medication reconciliation completed by provider prior to medication history? No    Time spent in this activity: 15 minutes    The information provided in this note is only as accurate as the sources available at the time of update(s)      Prior to Admission medications    Medication Sig Last Dose Taking? Auth Provider   cetirizine (ZYRTEC) 10 MG tablet Take 10 mg by mouth every morning   at am Yes Reported, Patient   fluticasone (FLONASE) 50 MCG/ACT nasal spray Spray 1 spray into both nostrils every morning  at am Yes Reported, Patient

## 2021-07-16 ENCOUNTER — TRANSFERRED RECORDS (OUTPATIENT)
Dept: HEALTH INFORMATION MANAGEMENT | Facility: CLINIC | Age: 51
End: 2021-07-16

## 2021-07-16 ENCOUNTER — ANESTHESIA EVENT (OUTPATIENT)
Dept: SURGERY | Facility: CLINIC | Age: 51
End: 2021-07-16
Payer: MEDICAID

## 2021-07-16 ENCOUNTER — HOSPITAL ENCOUNTER (OUTPATIENT)
Facility: CLINIC | Age: 51
Discharge: HOME | End: 2021-07-17
Attending: SURGERY | Admitting: SURGERY
Payer: MEDICAID

## 2021-07-16 ENCOUNTER — HOSPITAL ENCOUNTER (OUTPATIENT)
Dept: MAMMOGRAPHY | Facility: CLINIC | Age: 51
End: 2021-07-16
Attending: SURGERY
Payer: MEDICAID

## 2021-07-16 ENCOUNTER — ANESTHESIA (OUTPATIENT)
Dept: SURGERY | Facility: CLINIC | Age: 51
End: 2021-07-16
Payer: MEDICAID

## 2021-07-16 ENCOUNTER — HOSPITAL ENCOUNTER (OUTPATIENT)
Dept: NUCLEAR MEDICINE | Facility: CLINIC | Age: 51
Setting detail: NUCLEAR MEDICINE
Discharge: HOME | End: 2021-07-16
Attending: SURGERY | Admitting: SURGERY
Payer: MEDICAID

## 2021-07-16 ENCOUNTER — OFFICE VISIT (OUTPATIENT)
Dept: SURGERY | Facility: PHYSICIAN GROUP | Age: 51
End: 2021-07-16
Payer: MEDICAID

## 2021-07-16 DIAGNOSIS — C50.212 MALIGNANT NEOPLASM OF UPPER-INNER QUADRANT OF LEFT BREAST IN FEMALE, ESTROGEN RECEPTOR POSITIVE (H): ICD-10-CM

## 2021-07-16 DIAGNOSIS — C50.312 MALIGNANT NEOPLASM OF LOWER-INNER QUADRANT OF LEFT FEMALE BREAST, UNSPECIFIED ESTROGEN RECEPTOR STATUS (H): ICD-10-CM

## 2021-07-16 DIAGNOSIS — Z09 SURGICAL FOLLOW-UP CARE: Primary | ICD-10-CM

## 2021-07-16 DIAGNOSIS — Z17.0 MALIGNANT NEOPLASM OF UPPER-INNER QUADRANT OF LEFT BREAST IN FEMALE, ESTROGEN RECEPTOR POSITIVE (H): ICD-10-CM

## 2021-07-16 LAB — HCG UR QL: NEGATIVE

## 2021-07-16 PROCEDURE — 710N000009 HC RECOVERY PHASE 1, LEVEL 1, PER MIN: Performed by: SURGERY

## 2021-07-16 PROCEDURE — 250N000013 HC RX MED GY IP 250 OP 250 PS 637: Performed by: PHYSICIAN ASSISTANT

## 2021-07-16 PROCEDURE — 250N000009 HC RX 250: Performed by: SURGERY

## 2021-07-16 PROCEDURE — 999N000141 HC STATISTIC PRE-PROCEDURE NURSING ASSESSMENT: Performed by: SURGERY

## 2021-07-16 PROCEDURE — 272N000431 US RADIOACTIVE SEED PLACEMENT 1ST AREA

## 2021-07-16 PROCEDURE — 999N000104 MA BREAST SPECIMEN LEFT OR

## 2021-07-16 PROCEDURE — 250N000025 HC SEVOFLURANE, PER MIN: Performed by: SURGERY

## 2021-07-16 PROCEDURE — 370N000017 HC ANESTHESIA TECHNICAL FEE, PER MIN: Performed by: SURGERY

## 2021-07-16 PROCEDURE — 250N000011 HC RX IP 250 OP 636: Performed by: ANESTHESIOLOGY

## 2021-07-16 PROCEDURE — 38792 RA TRACER ID OF SENTINL NODE: CPT

## 2021-07-16 PROCEDURE — 250N000009 HC RX 250: Performed by: ANESTHESIOLOGY

## 2021-07-16 PROCEDURE — 258N000003 HC RX IP 258 OP 636: Performed by: NURSE ANESTHETIST, CERTIFIED REGISTERED

## 2021-07-16 PROCEDURE — 250N000011 HC RX IP 250 OP 636: Performed by: SURGERY

## 2021-07-16 PROCEDURE — 343N000001 HC RX 343: Performed by: SURGERY

## 2021-07-16 PROCEDURE — 272N000001 HC OR GENERAL SUPPLY STERILE: Performed by: SURGERY

## 2021-07-16 PROCEDURE — 19303 MAST SIMPLE COMPLETE: CPT | Mod: 50 | Performed by: SURGERY

## 2021-07-16 PROCEDURE — 999N000065 MA POST PROCEDURE LEFT

## 2021-07-16 PROCEDURE — 360N000083 HC SURGERY LEVEL 3 W/ FLUORO, PER MIN: Performed by: SURGERY

## 2021-07-16 PROCEDURE — 258N000003 HC RX IP 258 OP 636: Performed by: PHYSICIAN ASSISTANT

## 2021-07-16 PROCEDURE — 88331 PATH CONSLTJ SURG 1 BLK 1SPC: CPT | Mod: TC | Performed by: SURGERY

## 2021-07-16 PROCEDURE — 38525 BIOPSY/REMOVAL LYMPH NODES: CPT | Mod: 51 | Performed by: SURGERY

## 2021-07-16 PROCEDURE — 250N000011 HC RX IP 250 OP 636: Performed by: NURSE ANESTHETIST, CERTIFIED REGISTERED

## 2021-07-16 PROCEDURE — A9520 TC99 TILMANOCEPT DIAG 0.5MCI: HCPCS | Performed by: SURGERY

## 2021-07-16 PROCEDURE — 250N000009 HC RX 250: Performed by: NURSE ANESTHETIST, CERTIFIED REGISTERED

## 2021-07-16 PROCEDURE — 81025 URINE PREGNANCY TEST: CPT | Performed by: SURGERY

## 2021-07-16 RX ORDER — CLINDAMYCIN PHOSPHATE 900 MG/50ML
900 INJECTION, SOLUTION INTRAVENOUS SEE ADMIN INSTRUCTIONS
Status: DISCONTINUED | OUTPATIENT
Start: 2021-07-16 | End: 2021-07-16 | Stop reason: HOSPADM

## 2021-07-16 RX ORDER — HYDROMORPHONE HCL IN WATER/PF 6 MG/30 ML
0.2 PATIENT CONTROLLED ANALGESIA SYRINGE INTRAVENOUS EVERY 5 MIN PRN
Status: DISCONTINUED | OUTPATIENT
Start: 2021-07-16 | End: 2021-07-16 | Stop reason: HOSPADM

## 2021-07-16 RX ORDER — CETIRIZINE HYDROCHLORIDE 10 MG/1
10 TABLET ORAL EVERY MORNING
Status: DISCONTINUED | OUTPATIENT
Start: 2021-07-17 | End: 2021-07-17 | Stop reason: HOSPADM

## 2021-07-16 RX ORDER — SODIUM CHLORIDE, SODIUM LACTATE, POTASSIUM CHLORIDE, CALCIUM CHLORIDE 600; 310; 30; 20 MG/100ML; MG/100ML; MG/100ML; MG/100ML
INJECTION, SOLUTION INTRAVENOUS CONTINUOUS
Status: DISCONTINUED | OUTPATIENT
Start: 2021-07-16 | End: 2021-07-16 | Stop reason: HOSPADM

## 2021-07-16 RX ORDER — OXYCODONE HYDROCHLORIDE 5 MG/1
10 TABLET ORAL EVERY 4 HOURS PRN
Status: DISCONTINUED | OUTPATIENT
Start: 2021-07-16 | End: 2021-07-17 | Stop reason: HOSPADM

## 2021-07-16 RX ORDER — MAGNESIUM HYDROXIDE 1200 MG/15ML
LIQUID ORAL PRN
Status: DISCONTINUED | OUTPATIENT
Start: 2021-07-16 | End: 2021-07-16 | Stop reason: HOSPADM

## 2021-07-16 RX ORDER — FENTANYL CITRATE 50 UG/ML
INJECTION, SOLUTION INTRAMUSCULAR; INTRAVENOUS PRN
Status: DISCONTINUED | OUTPATIENT
Start: 2021-07-16 | End: 2021-07-16

## 2021-07-16 RX ORDER — ONDANSETRON 2 MG/ML
4 INJECTION INTRAMUSCULAR; INTRAVENOUS EVERY 30 MIN PRN
Status: DISCONTINUED | OUTPATIENT
Start: 2021-07-16 | End: 2021-07-16 | Stop reason: HOSPADM

## 2021-07-16 RX ORDER — NALOXONE HYDROCHLORIDE 0.4 MG/ML
0.2 INJECTION, SOLUTION INTRAMUSCULAR; INTRAVENOUS; SUBCUTANEOUS
Status: DISCONTINUED | OUTPATIENT
Start: 2021-07-16 | End: 2021-07-17 | Stop reason: HOSPADM

## 2021-07-16 RX ORDER — ONDANSETRON 2 MG/ML
4 INJECTION INTRAMUSCULAR; INTRAVENOUS EVERY 6 HOURS PRN
Status: DISCONTINUED | OUTPATIENT
Start: 2021-07-16 | End: 2021-07-17 | Stop reason: HOSPADM

## 2021-07-16 RX ORDER — ONDANSETRON 4 MG/1
4 TABLET, ORALLY DISINTEGRATING ORAL EVERY 6 HOURS PRN
Status: DISCONTINUED | OUTPATIENT
Start: 2021-07-16 | End: 2021-07-17 | Stop reason: HOSPADM

## 2021-07-16 RX ORDER — ONDANSETRON 2 MG/ML
4 INJECTION INTRAMUSCULAR; INTRAVENOUS
Status: DISCONTINUED | OUTPATIENT
Start: 2021-07-16 | End: 2021-07-16 | Stop reason: HOSPADM

## 2021-07-16 RX ORDER — PROPOFOL 10 MG/ML
INJECTION, EMULSION INTRAVENOUS PRN
Status: DISCONTINUED | OUTPATIENT
Start: 2021-07-16 | End: 2021-07-16

## 2021-07-16 RX ORDER — AMOXICILLIN 250 MG
1 CAPSULE ORAL AT BEDTIME
Status: DISCONTINUED | OUTPATIENT
Start: 2021-07-16 | End: 2021-07-17 | Stop reason: HOSPADM

## 2021-07-16 RX ORDER — FLUTICASONE PROPIONATE 50 MCG
1 SPRAY, SUSPENSION (ML) NASAL EVERY MORNING
Status: DISCONTINUED | OUTPATIENT
Start: 2021-07-17 | End: 2021-07-17 | Stop reason: HOSPADM

## 2021-07-16 RX ORDER — LIDOCAINE HYDROCHLORIDE 20 MG/ML
INJECTION, SOLUTION INFILTRATION; PERINEURAL PRN
Status: DISCONTINUED | OUTPATIENT
Start: 2021-07-16 | End: 2021-07-16

## 2021-07-16 RX ORDER — OXYCODONE HYDROCHLORIDE 5 MG/1
5 TABLET ORAL EVERY 4 HOURS PRN
Status: DISCONTINUED | OUTPATIENT
Start: 2021-07-16 | End: 2021-07-17 | Stop reason: HOSPADM

## 2021-07-16 RX ORDER — LIDOCAINE 40 MG/G
CREAM TOPICAL
Status: DISCONTINUED | OUTPATIENT
Start: 2021-07-16 | End: 2021-07-17 | Stop reason: HOSPADM

## 2021-07-16 RX ORDER — SCOLOPAMINE TRANSDERMAL SYSTEM 1 MG/1
1 PATCH, EXTENDED RELEASE TRANSDERMAL
Status: DISCONTINUED | OUTPATIENT
Start: 2021-07-16 | End: 2021-07-17 | Stop reason: HOSPADM

## 2021-07-16 RX ORDER — ONDANSETRON 2 MG/ML
INJECTION INTRAMUSCULAR; INTRAVENOUS PRN
Status: DISCONTINUED | OUTPATIENT
Start: 2021-07-16 | End: 2021-07-16

## 2021-07-16 RX ORDER — SODIUM CHLORIDE, SODIUM LACTATE, POTASSIUM CHLORIDE, CALCIUM CHLORIDE 600; 310; 30; 20 MG/100ML; MG/100ML; MG/100ML; MG/100ML
INJECTION, SOLUTION INTRAVENOUS CONTINUOUS PRN
Status: DISCONTINUED | OUTPATIENT
Start: 2021-07-16 | End: 2021-07-16

## 2021-07-16 RX ORDER — MEPERIDINE HYDROCHLORIDE 25 MG/ML
12.5 INJECTION INTRAMUSCULAR; INTRAVENOUS; SUBCUTANEOUS
Status: DISCONTINUED | OUTPATIENT
Start: 2021-07-16 | End: 2021-07-16 | Stop reason: HOSPADM

## 2021-07-16 RX ORDER — FENTANYL CITRATE 50 UG/ML
50 INJECTION, SOLUTION INTRAMUSCULAR; INTRAVENOUS EVERY 5 MIN PRN
Status: DISCONTINUED | OUTPATIENT
Start: 2021-07-16 | End: 2021-07-16 | Stop reason: HOSPADM

## 2021-07-16 RX ORDER — SODIUM CHLORIDE, SODIUM LACTATE, POTASSIUM CHLORIDE, CALCIUM CHLORIDE 600; 310; 30; 20 MG/100ML; MG/100ML; MG/100ML; MG/100ML
INJECTION, SOLUTION INTRAVENOUS CONTINUOUS
Status: DISCONTINUED | OUTPATIENT
Start: 2021-07-16 | End: 2021-07-17 | Stop reason: HOSPADM

## 2021-07-16 RX ORDER — NALOXONE HYDROCHLORIDE 0.4 MG/ML
0.4 INJECTION, SOLUTION INTRAMUSCULAR; INTRAVENOUS; SUBCUTANEOUS
Status: DISCONTINUED | OUTPATIENT
Start: 2021-07-16 | End: 2021-07-17 | Stop reason: HOSPADM

## 2021-07-16 RX ORDER — PROPOFOL 10 MG/ML
INJECTION, EMULSION INTRAVENOUS CONTINUOUS PRN
Status: DISCONTINUED | OUTPATIENT
Start: 2021-07-16 | End: 2021-07-16

## 2021-07-16 RX ORDER — HYDROMORPHONE HCL IN WATER/PF 6 MG/30 ML
0.2 PATIENT CONTROLLED ANALGESIA SYRINGE INTRAVENOUS
Status: DISCONTINUED | OUTPATIENT
Start: 2021-07-16 | End: 2021-07-17 | Stop reason: HOSPADM

## 2021-07-16 RX ORDER — BUPIVACAINE HYDROCHLORIDE 5 MG/ML
INJECTION, SOLUTION EPIDURAL; INTRACAUDAL
Status: DISCONTINUED
Start: 2021-07-16 | End: 2021-07-16 | Stop reason: HOSPADM

## 2021-07-16 RX ORDER — ONDANSETRON 4 MG/1
4 TABLET, ORALLY DISINTEGRATING ORAL EVERY 30 MIN PRN
Status: DISCONTINUED | OUTPATIENT
Start: 2021-07-16 | End: 2021-07-16 | Stop reason: HOSPADM

## 2021-07-16 RX ORDER — HYDROMORPHONE HCL IN WATER/PF 6 MG/30 ML
0.4 PATIENT CONTROLLED ANALGESIA SYRINGE INTRAVENOUS
Status: DISCONTINUED | OUTPATIENT
Start: 2021-07-16 | End: 2021-07-17 | Stop reason: HOSPADM

## 2021-07-16 RX ORDER — DEXAMETHASONE SODIUM PHOSPHATE 4 MG/ML
INJECTION, SOLUTION INTRA-ARTICULAR; INTRALESIONAL; INTRAMUSCULAR; INTRAVENOUS; SOFT TISSUE PRN
Status: DISCONTINUED | OUTPATIENT
Start: 2021-07-16 | End: 2021-07-16

## 2021-07-16 RX ORDER — CLINDAMYCIN PHOSPHATE 900 MG/50ML
900 INJECTION, SOLUTION INTRAVENOUS
Status: COMPLETED | OUTPATIENT
Start: 2021-07-16 | End: 2021-07-16

## 2021-07-16 RX ORDER — FENTANYL CITRATE 0.05 MG/ML
50 INJECTION, SOLUTION INTRAMUSCULAR; INTRAVENOUS
Status: DISCONTINUED | OUTPATIENT
Start: 2021-07-16 | End: 2021-07-16 | Stop reason: HOSPADM

## 2021-07-16 RX ORDER — ACETAMINOPHEN 325 MG/1
650 TABLET ORAL EVERY 6 HOURS PRN
Status: DISCONTINUED | OUTPATIENT
Start: 2021-07-16 | End: 2021-07-17 | Stop reason: HOSPADM

## 2021-07-16 RX ORDER — PROCHLORPERAZINE MALEATE 10 MG
10 TABLET ORAL EVERY 6 HOURS PRN
Status: DISCONTINUED | OUTPATIENT
Start: 2021-07-16 | End: 2021-07-17 | Stop reason: HOSPADM

## 2021-07-16 RX ADMIN — DEXMEDETOMIDINE 8 MCG: 100 INJECTION, SOLUTION, CONCENTRATE INTRAVENOUS at 13:45

## 2021-07-16 RX ADMIN — SODIUM CHLORIDE, POTASSIUM CHLORIDE, SODIUM LACTATE AND CALCIUM CHLORIDE: 600; 310; 30; 20 INJECTION, SOLUTION INTRAVENOUS at 16:14

## 2021-07-16 RX ADMIN — DEXMEDETOMIDINE 8 MCG: 100 INJECTION, SOLUTION, CONCENTRATE INTRAVENOUS at 15:05

## 2021-07-16 RX ADMIN — SENNOSIDES AND DOCUSATE SODIUM 1 TABLET: 8.6; 5 TABLET ORAL at 21:52

## 2021-07-16 RX ADMIN — FENTANYL CITRATE 50 MCG: 50 INJECTION, SOLUTION INTRAMUSCULAR; INTRAVENOUS at 13:10

## 2021-07-16 RX ADMIN — DEXMEDETOMIDINE 4 MCG: 100 INJECTION, SOLUTION, CONCENTRATE INTRAVENOUS at 15:59

## 2021-07-16 RX ADMIN — HYDROMORPHONE HYDROCHLORIDE 0.5 MG: 1 INJECTION, SOLUTION INTRAMUSCULAR; INTRAVENOUS; SUBCUTANEOUS at 13:34

## 2021-07-16 RX ADMIN — PROPOFOL 150 MG: 10 INJECTION, EMULSION INTRAVENOUS at 13:11

## 2021-07-16 RX ADMIN — HYDROMORPHONE HYDROCHLORIDE 0.2 MG: 0.2 INJECTION, SOLUTION INTRAMUSCULAR; INTRAVENOUS; SUBCUTANEOUS at 17:08

## 2021-07-16 RX ADMIN — LIDOCAINE HYDROCHLORIDE 50 MG: 20 INJECTION, SOLUTION INFILTRATION; PERINEURAL at 13:10

## 2021-07-16 RX ADMIN — FENTANYL CITRATE 50 MCG: 50 INJECTION, SOLUTION INTRAMUSCULAR; INTRAVENOUS at 13:11

## 2021-07-16 RX ADMIN — DEXAMETHASONE SODIUM PHOSPHATE 4 MG: 4 INJECTION, SOLUTION INTRA-ARTICULAR; INTRALESIONAL; INTRAMUSCULAR; INTRAVENOUS; SOFT TISSUE at 13:19

## 2021-07-16 RX ADMIN — ONDANSETRON 4 MG: 2 INJECTION INTRAMUSCULAR; INTRAVENOUS at 15:25

## 2021-07-16 RX ADMIN — PROPOFOL 50 MG: 10 INJECTION, EMULSION INTRAVENOUS at 14:27

## 2021-07-16 RX ADMIN — DEXMEDETOMIDINE 8 MCG: 100 INJECTION, SOLUTION, CONCENTRATE INTRAVENOUS at 13:10

## 2021-07-16 RX ADMIN — HYDROMORPHONE HYDROCHLORIDE 0.5 MG: 1 INJECTION, SOLUTION INTRAMUSCULAR; INTRAVENOUS; SUBCUTANEOUS at 14:48

## 2021-07-16 RX ADMIN — HYDROMORPHONE HYDROCHLORIDE 0.2 MG: 0.2 INJECTION, SOLUTION INTRAMUSCULAR; INTRAVENOUS; SUBCUTANEOUS at 17:44

## 2021-07-16 RX ADMIN — CLINDAMYCIN PHOSPHATE 900 MG: 900 INJECTION, SOLUTION INTRAVENOUS at 13:04

## 2021-07-16 RX ADMIN — ONDANSETRON 4 MG: 2 INJECTION INTRAMUSCULAR; INTRAVENOUS at 13:22

## 2021-07-16 RX ADMIN — LIDOCAINE HYDROCHLORIDE 5 ML: 10 INJECTION, SOLUTION INFILTRATION; PERINEURAL at 09:13

## 2021-07-16 RX ADMIN — SODIUM CHLORIDE, POTASSIUM CHLORIDE, SODIUM LACTATE AND CALCIUM CHLORIDE: 600; 310; 30; 20 INJECTION, SOLUTION INTRAVENOUS at 13:03

## 2021-07-16 RX ADMIN — TILMANOCEPT 0.55 MILLICURIE: KIT at 11:32

## 2021-07-16 RX ADMIN — LIDOCAINE HYDROCHLORIDE 50 MG: 20 INJECTION, SOLUTION INFILTRATION; PERINEURAL at 13:11

## 2021-07-16 RX ADMIN — SODIUM CHLORIDE, POTASSIUM CHLORIDE, SODIUM LACTATE AND CALCIUM CHLORIDE: 600; 310; 30; 20 INJECTION, SOLUTION INTRAVENOUS at 20:19

## 2021-07-16 RX ADMIN — OXYCODONE HYDROCHLORIDE 5 MG: 5 TABLET ORAL at 21:52

## 2021-07-16 RX ADMIN — SCOPALAMINE 1 PATCH: 1 PATCH, EXTENDED RELEASE TRANSDERMAL at 12:21

## 2021-07-16 RX ADMIN — TILMANOCEPT 0.57 MILLICURIE: KIT at 11:30

## 2021-07-16 RX ADMIN — PROPOFOL 150 MCG/KG/MIN: 10 INJECTION, EMULSION INTRAVENOUS at 13:13

## 2021-07-16 RX ADMIN — MIDAZOLAM 2 MG: 1 INJECTION INTRAMUSCULAR; INTRAVENOUS at 13:04

## 2021-07-16 ASSESSMENT — ENCOUNTER SYMPTOMS
DYSRHYTHMIAS: 0
SEIZURES: 0
ORTHOPNEA: 0

## 2021-07-16 ASSESSMENT — COPD QUESTIONNAIRES: COPD: 0

## 2021-07-16 ASSESSMENT — MIFFLIN-ST. JEOR: SCORE: 1307.86

## 2021-07-16 ASSESSMENT — LIFESTYLE VARIABLES: TOBACCO_USE: 0

## 2021-07-16 NOTE — PROGRESS NOTES
SBAR Seed Localization    SITUATION:  Patient to breast imaging center for imaging guided seed localizations before breast lumpectomy or excision biopsy with sentinel node injection.    BACKGROUND:  Breast imaging cancer, breast abnormality  Ordered procedure completed: No  Special needs identified: No     ASSESSMENT:  SBAR report called to patient care unit because of unexpected event in radiology: Yes  Allergies and medication list reviewed prior to procedure. No  Skin cleansed with ChloraPrep One-Step.  Anesthesia: approximately 5ml of 1% Lidocaine injection subcutaneous before seed insertion administered by the radiologist.   Gauze dressing over insertion site(s).  Post procedure mammogram completed: Yes    Patient tolerance:well    RECOMMENDATIONS:  Patient transferred to Same Day Surgery in stable condition via wheelchair with Breast Imaging Staff.    Please call [facility] [phone] if there are any questions.

## 2021-07-16 NOTE — DISCHARGE INSTRUCTIONS
After Your Breast Biopsy    Bleeding or bruising: Slight bruising is normal.  If you bleed through the bandage, put direct pressure on the breast.  If you are still bleeding after 20 minutes, call the doctor who ordered the exam.    Bandages: Keep your bandage in place until tomorrow morning.  Do not get it wet.  .  On the second day, cover it with a Band-Aid.    Activity: You may shower the morning after the exam.  No heavy activity (lifting, vacuuming) for 24 hours.    Discomfort: Wear your bra overnight to support the breast.  You may take Tylenol (acetaminophen) for pain.  If you had a stereotactic of MR-directed biopsy, you may take aspirin or ibuprofen (Advil, Motrin) the morning after your biopsy, unless your doctor tells you not to.    Infection: Infection is rare.  Symptoms include fever, redness, increasing pain and fluid draining from the biopsy site.  If you have any of these symptoms, please call the doctor who ordered your exam.    Results: Results may take up to three business days.  If you have not heard your results in three days, {CALL OPTIONS:[id number]}  In rare cases, we may need to do another biopsy.    Call the doctor who ordered your exam if:    You have bleeding that lasts more than 20 minutes.    You have pain that cannot be controlled.    You have signs of infection (fever, redness, drainage or other signs).    You have not had your results within three days.    Nurse navigator: Our nurse navigator is here to answer your questions and help you set up future clinic visits.  Please call [phone].    Thank you for choosing [facility].  Please call us if you have questions or concerns about your biopsy.

## 2021-07-16 NOTE — OR NURSING
Phone report given to Florencia PADILLA and pt transferred, with one belongings bag, to Observation Unit.  Pt's mother given update.

## 2021-07-16 NOTE — ANESTHESIA PREPROCEDURE EVALUATION
Anesthesia Pre-Procedure Evaluation    Patient: January Odonnell   MRN: [id number] : 1970        Preoperative Diagnosis: Malignant neoplasm of upper-inner quadrant of left breast in female, estrogen receptor positive (H) [C50.212, Z17.0]   Procedure : Procedure(s):  Bilateral mastectomies with bilateral sentinel lymph node biopsy  seed localized left axillary node excision  possible axillary node dissection     Past Medical History:   Diagnosis Date     Allergic state      Anxiety      Breast cancer (H)       Past Surgical History:   Procedure Laterality Date     ENT SURGERY      Tonsilectomy at age 6     GYN SURGERY      D&C     NASAL/SINUS POLYPECTOMY       ORTHOPEDIC SURGERY      broken arm at age 10      Allergies   Allergen Reactions     Ampicillin      Augmentin      Tetracycline      Amoxicillin Rash     Penicillins Rash      Social History     Tobacco Use     Smoking status: Never Smoker     Smokeless tobacco: Never Used   Substance Use Topics     Alcohol use: Yes     Comment: rarely      Wt Readings from Last 1 Encounters:   21 73.8 kg (162 lb 9.6 oz)        Prior to Admission medications    Medication Sig Start Date End Date Taking? Authorizing Provider   cetirizine (ZYRTEC) 10 MG tablet Take 10 mg by mouth every morning    Yes Reported, Patient   fluticasone (FLONASE) 50 MCG/ACT nasal spray Spray 1 spray into both nostrils every morning   Yes Reported, Patient     No results for input(s): ABO, RH in the last 04728 hours.  Recent Labs   Lab Test 21  0956   HCG Negative     Recent Results (from the past 744 hour(s))   MA Diagnostic Bilateral w/Wilmer    Narrative    Examination: Bilateral digital diagnostic mammography and digital  breast tomosynthesis with computer aided detection, and focused  ultrasound of the LEFT breast, 2021.    Comparison: 2017 and 2019.    History: Recently diagnosed LEFT breast grade 3 invasive ductal  carcinoma. Patient was initially evaluated and  biopsied in [facility] but  has returned to the [place] for further workup and treatment.    BREAST DENSITY: Heterogeneously dense    Findings: Bilateral mammography with digital breast tomosynthesis  performed with a triangle-shaped marker medially in the LEFT breast at  the patient's palpable area of concern. No concerning  mammographic/tomographic findings in the RIGHT breast.    Oval mass is seen just deep to the marker, measuring up to 2.1 cm in  maximal dimension mammographically. Multiple adjacent smaller masses  and architectural distortion are seen throughout the medial LEFT  breast with a total extent of approximately 9 cm. There is also  associated skin retraction. Additionally, prominent lymph node is seen  in the LEFT axilla.    Focussed ultrasound by radiologist and technologist of the upper inner  and lower inner quadrants of the LEFT breast was performed. Oval  hypoechoic mass with somewhat indistinct margins is seen at the  patient's palpable area of concern, 7:00 4 cm from the nipple on the  LEFT. This accounts for the mammographic detected oval mass and  measures 1.7 x 1.3 x 1.7 cm. There is ill-defined hypoechogenicity  extending toward the nipple from the primary mass. Additionally,  extending approximately 7 cm medially and inferiorly there are  multiple smaller hypoechoic masses that appear fairly contiguous.    LEFT axillary ultrasound shows an enlarged lymph node measuring up to  1.9 cm in maximal dimension.      Impression    IMPRESSION: BI-RADS CATEGORY: 5 - Highly Suggestive of  Malignancy-Appropriate Action Should Be Taken.    RECOMMENDED FOLLOW-UP: Biopsy.    Ultrasound-guided core needle biopsy of LEFT breast mass as well as  LEFT axillary lymph node.    The patient was given the results of the examination.    ARMINDA CONNELL MD   US Breast Left Limited 1-3 Quadrants    Narrative    Examination: Bilateral digital diagnostic mammography and digital  breast tomosynthesis with computer  aided detection, and focused  ultrasound of the LEFT breast, 6/25/2021.    Comparison: 8/8/2017 and 6/17/2019.    History: Recently diagnosed LEFT breast grade 3 invasive ductal  carcinoma. Patient was initially evaluated and biopsied in [facility] but  has returned to the [place] for further workup and treatment.    BREAST DENSITY: Heterogeneously dense    Findings: Bilateral mammography with digital breast tomosynthesis  performed with a triangle-shaped marker medially in the LEFT breast at  the patient's palpable area of concern. No concerning  mammographic/tomographic findings in the RIGHT breast.    Oval mass is seen just deep to the marker, measuring up to 2.1 cm in  maximal dimension mammographically. Multiple adjacent smaller masses  and architectural distortion are seen throughout the medial LEFT  breast with a total extent of approximately 9 cm. There is also  associated skin retraction. Additionally, prominent lymph node is seen  in the LEFT axilla.    Focussed ultrasound by radiologist and technologist of the upper inner  and lower inner quadrants of the LEFT breast was performed. Oval  hypoechoic mass with somewhat indistinct margins is seen at the  patient's palpable area of concern, 7:00 4 cm from the nipple on the  LEFT. This accounts for the mammographic detected oval mass and  measures 1.7 x 1.3 x 1.7 cm. There is ill-defined hypoechogenicity  extending toward the nipple from the primary mass. Additionally,  extending approximately 7 cm medially and inferiorly there are  multiple smaller hypoechoic masses that appear fairly contiguous.    LEFT axillary ultrasound shows an enlarged lymph node measuring up to  1.9 cm in maximal dimension.      Impression    IMPRESSION: BI-RADS CATEGORY: 5 - Highly Suggestive of  Malignancy-Appropriate Action Should Be Taken.    RECOMMENDED FOLLOW-UP: Biopsy.    Ultrasound-guided core needle biopsy of LEFT breast mass as well as  LEFT axillary lymph node.    The  patient was given the results of the examination.    ARMINDA CONNELL MD   US Breast Biopsy Core Needle, 1St Lesion Left    Addendum: 6/29/2021    Pathology Results:  Specimen A, 7:00 4 cm from the nipple on the LEFT: Invasive ductal  carcinoma.  Specimen B, LEFT axillary lymph node: Metastatic carcinoma.  Please see full pathology report for further details.     Results are concordant with imaging findings.     Recommendation: Continued surgical/oncologic management.    ARMINDA CONNELL MD      Narrative    Ultrasound guided LEFT breast and LEFT axillary lymph node biopsies.    Comparisons: Mammogram and ultrasound earlier on the same day.    FINDINGS: Procedure, risks, benefits and alternatives were discussed  with the patient and the patient gave written and verbal consent.  Aseptic technique was utilized. 1% lidocaine was utilized for local  anesthesia. Under ultrasound guidance, biopsies of mass and lymph node  were performed and markers placed as follows and images were archived:    Specimen A:  Size: 14 gauge core needle biopsy system  Number of cores: 3  Position: 7:00 4 cm from the nipple on the LEFT.  Marker: HydroMark, shaped like a coil with looped ends.     Specimen B:  Size: 14 gauge core needle biopsy system  Number of cores: 3  Position: LEFT axilla  Marker: HydroMark, shaped like a coil with open ends.     Less than 5 mL blood loss. Pressure was held over this area for  approximately 10 minutes. A dressing was placed and care instructions  were discussed with the patient.    Post biopsy mammogram demonstrates clip deployment at both sites.      Impression    IMPRESSION:    Uncomplicated ultrasound guided core needle biopsies of  the LEFT breast and LEFT axilla.    ARMINDA CONNELL MD   MA Post Procedure Left    Addendum: 6/29/2021    Pathology Results:  Specimen A, 7:00 4 cm from the nipple on the LEFT: Invasive ductal  carcinoma.  Specimen B, LEFT axillary lymph node: Metastatic carcinoma.  Please see full  pathology report for further details.     Results are concordant with imaging findings.     Recommendation: Continued surgical/oncologic management.    ARMINDA CONNELL MD      Narrative    Ultrasound guided LEFT breast and LEFT axillary lymph node biopsies.    Comparisons: Mammogram and ultrasound earlier on the same day.    FINDINGS: Procedure, risks, benefits and alternatives were discussed  with the patient and the patient gave written and verbal consent.  Aseptic technique was utilized. 1% lidocaine was utilized for local  anesthesia. Under ultrasound guidance, biopsies of mass and lymph node  were performed and markers placed as follows and images were archived:    Specimen A:  Size: 14 gauge core needle biopsy system  Number of cores: 3  Position: 7:00 4 cm from the nipple on the LEFT.  Marker: HydroMark, shaped like a coil with looped ends.     Specimen B:  Size: 14 gauge core needle biopsy system  Number of cores: 3  Position: LEFT axilla  Marker: HydroMark, shaped like a coil with open ends.     Less than 5 mL blood loss. Pressure was held over this area for  approximately 10 minutes. A dressing was placed and care instructions  were discussed with the patient.    Post biopsy mammogram demonstrates clip deployment at both sites.      Impression    IMPRESSION:    Uncomplicated ultrasound guided core needle biopsies of  the LEFT breast and LEFT axilla.    ARMINDA CONNELL MD   US Biopsy Lymph Node Core Additional Node    Addendum: 6/29/2021    Pathology Results:  Specimen A, 7:00 4 cm from the nipple on the LEFT: Invasive ductal  carcinoma.  Specimen B, LEFT axillary lymph node: Metastatic carcinoma.  Please see full pathology report for further details.     Results are concordant with imaging findings.     Recommendation: Continued surgical/oncologic management.    ARMINDA CONNELL MD      Narrative    Ultrasound guided LEFT breast and LEFT axillary lymph node biopsies.    Comparisons: Mammogram and ultrasound  earlier on the same day.    FINDINGS: Procedure, risks, benefits and alternatives were discussed  with the patient and the patient gave written and verbal consent.  Aseptic technique was utilized. 1% lidocaine was utilized for local  anesthesia. Under ultrasound guidance, biopsies of mass and lymph node  were performed and markers placed as follows and images were archived:    Specimen A:  Size: 14 gauge core needle biopsy system  Number of cores: 3  Position: 7:00 4 cm from the nipple on the LEFT.  Marker: HydroMark, shaped like a coil with looped ends.     Specimen B:  Size: 14 gauge core needle biopsy system  Number of cores: 3  Position: LEFT axilla  Marker: HydroMark, shaped like a coil with open ends.     Less than 5 mL blood loss. Pressure was held over this area for  approximately 10 minutes. A dressing was placed and care instructions  were discussed with the patient.    Post biopsy mammogram demonstrates clip deployment at both sites.      Impression    IMPRESSION:    Uncomplicated ultrasound guided core needle biopsies of  the LEFT breast and LEFT axilla.    ARMINDA CONNELL MD   MR Breast Bilateral w/o & w Contrast    Narrative    MRI BREASTS, BILATERAL, WITHOUT AND WITH CONTRAST- 7/6/2021 1:28 PM    HISTORY: Recently diagnosed invasive ductal carcinoma of the left  breast. A left axillary lymph node was biopsied and positive for  metastatic carcinoma.    COMPARISON: Diagnostic mammography, ultrasound and biopsy from  6/25/2021. No previous breast MRIs.    Technique: Precontrast gradient recall axial nonfat sat T1.Precontrast  gradient recall axial fat-sat T1. Precontrast STIR axial fat sat.  Postcontrast gradient recall axial fat-sat T1 with dynamic  postcontrast acquisitions at 2, 4 and 6 minutes with subtractions.  Delayed high-resolution gradient recall sagittal fat-sat T1. MIP of  subtractions, axial coronal and sagittal. Color encoding of post  contrast dynamic acquisitions. IV contrast: 7 mL  Gadavist  Breast composition: Heterogeneous fibroglandular tissue.  Background parenchymal enhancement: Moderate    FINDINGS:   Right breast: There is an area of suspicious nonmass enhancement in  the posterior depth of the right breast at approximately 6:00 (series  6 image 96). This should be further evaluated with ultrasound and  biopsy. No other abnormal contrast enhancement in the right breast.  There are no enlarged right axillary lymph nodes.    Left breast: There is a small hematoma surrounding the biopsy marker  at the 7:00 position. There is segmental clumped nonmass enhancement  in the lower inner quadrant. This spans a distance of 8.5 x 2.5 x 2.4  cm. No additional finding of suspicion in the left breast. There is an  enlarged level 1 axillary lymph node containing a biopsy marker. There  is a nonenlarged but morphologically abnormal level 2 axillary lymph  node (series 6 image 34).      Impression    IMPRESSION: BI-RADS CATEGORY: 4 - Suspicious Abnormality-Biopsy Should  Be Considered.  1. Segmental malignant appearing enhancement in the lower inner left  breast measuring 8.5 x 2.5 x 2.4 cm.  2. Left axillary lymphadenopathy.  3. Suspicious area of enhancement in the right breast as described.  Recommend focused ultrasound and biopsy for further evaluation.    RECOMMENDED FOLLOW-UP: Right breast ultrasound and biopsy.        LAURA HERNDON MD         SYSTEM ID:  [id number]   US Breast F/U MRI Right Limited 1-3 Quad    Narrative    ULTRASOUND RIGHT BREAST F/U MRI LIMITED 1-3 QUADRANTS July 13, 2021  1:36 PM     HISTORY: Recently diagnosed invasive ductal carcinoma of the left  breast. Preoperative breast MRI demonstrated abnormal enhancement in  the right breast for which focused ultrasound is recommended in  further evaluation.     COMPARISON: 7/6/2021.     FINDINGS: Ultrasound of the central, inferior and retroareolar right  breast was performed. No sonographic abnormality is seen. MRI guided  biopsy of  the abnormal enhancement is recommended.    Findings and recommendations were discussed with the patient.       Impression    IMPRESSION: BI-RADS CATEGORY: 1 -  NEGATIVE.    RECOMMENDED FOLLOW-UP: MRI guided biopsy of the right breast.    LAURA HERNDON MD         SYSTEM ID:  [id number]    Radioactive Seed Placement 1st Area    Narrative    LEFT AXILLARY SEED LOCALIZATION;  POST SEED PLACEMENT LEFT DIGITAL MAMMOGRAM;  7/16/2021 9:32 AM     HISTORY: LEFT breast cancer with axillary kiana metastasis. Imaging  directed localization requested prior to excision.     COMPARISON: 6/25/2021    PROCEDURE:  Informed consent was obtained prior to the procedure.  The  skin overlying the target lesion was marked, sterilized and  anesthetized using 1% lidocaine.  Using continuous ultrasound  guidance, one radioactive seed(s) was/were deployed adjacent to biopsy  marker.  Adequate placement was confirmed with imaging.  Images were  archived. There were no complications.    Post procedure mammogram confirms satisfactory positioning of the  seed(s).  The images were annotated and provided to the surgeon.    Total seed radiation dose: 0.213 mCi I125      Impression    IMPRESSION: Successful placement of radioactive seed(s) for operative  guidance.     ARMINDA CONNELL MD         SYSTEM ID:  [id number]   MA Post Procedure Left    Narrative    LEFT AXILLARY SEED LOCALIZATION;  POST SEED PLACEMENT LEFT DIGITAL MAMMOGRAM;  7/16/2021 9:32 AM     HISTORY: LEFT breast cancer with axillary kiana metastasis. Imaging  directed localization requested prior to excision.     COMPARISON: 6/25/2021    PROCEDURE:  Informed consent was obtained prior to the procedure.  The  skin overlying the target lesion was marked, sterilized and  anesthetized using 1% lidocaine.  Using continuous ultrasound  guidance, one radioactive seed(s) was/were deployed adjacent to biopsy  marker.  Adequate placement was confirmed with imaging.  Images were  archived. There were  no complications.    Post procedure mammogram confirms satisfactory positioning of the  seed(s).  The images were annotated and provided to the surgeon.    Total seed radiation dose: 0.213 mCi I125      Impression    IMPRESSION: Successful placement of radioactive seed(s) for operative  guidance.     ARMINDA CONNELL MD         SYSTEM ID:  [id number]       Anesthesia Evaluation   Pt has had prior anesthetic. Type: General.    History of anesthetic complications  - motion sickness.      ROS/MED HX  ENT/Pulmonary:     (+) allergic rhinitis,  (-) tobacco use, asthma, COPD, sleep apnea and recent URI   Neurologic:    (-) no seizures and no CVA   Cardiovascular:    (-) angina, hypertension, CAD, CHF, VINCENT, orthopnea/PND, syncope, arrhythmias, irregular heartbeat/palpitations, valvular problems/murmurs, angina, murmur and wheezes   METS/Exercise Tolerance: >4 METS    Hematologic:       Musculoskeletal:       GI/Hepatic:    (-) GERD and liver disease   Renal/Genitourinary:    (-) renal disease   Endo:    (-) Type II DM, thyroid disease and chronic steroid usage   Psychiatric/Substance Use:     (+) psychiatric history anxiety  (-) alcohol abuse history   Infectious Disease:       Malignancy:   (+) Malignancy, History of Breast.    Other:            Physical Exam    Airway        Mallampati: I   TM distance: > 3 FB   Neck ROM: full   Mouth opening: > 3 cm    Respiratory Devices and Support         Dental  no notable dental history         Cardiovascular          Rhythm and rate: regular and normal (-) no murmur    Pulmonary           breath sounds clear to auscultation   (-) no rhonchi and no wheezes        OUTSIDE LABS:  CBC:   Lab Results   Component Value Date    WBC 7.4 12/23/2017    HGB 13.4 12/23/2017    HCT 39.9 12/23/2017     12/23/2017     BMP:   Lab Results   Component Value Date     12/23/2017    POTASSIUM 3.4 12/23/2017    CHLORIDE 104 12/23/2017    CO2 22 12/23/2017    BUN 11 12/23/2017    CR 0.82  12/23/2017     (H) 12/23/2017     POC:   Lab Results   Component Value Date    HCG Negative 07/16/2021     OTHER:   Lab Results   Component Value Date    NEFTALI 8.5 12/23/2017       Anesthesia Plan    ASA Status:  2   NPO Status:  NPO Appropriate    Anesthesia Type: General.     - Airway: LMA   Induction: Propofol, Intravenous.   Maintenance: TIVA.        Consents    Anesthesia Plan(s) and associated risks, benefits, and realistic alternatives discussed. Questions answered and patient/representative(s) expressed understanding.     - Discussed with:  Patient         Postoperative Care    Pain management: Multi-modal analgesia.   PONV prophylaxis: Ondansetron (or other 5HT-3), Dexamethasone or Solumedrol, Scopolamine patch     Comments:    Avoid Toradol             Carroll Villegas MD

## 2021-07-16 NOTE — OP NOTE
General Surgery Operative Note      Pre-operative diagnosis: Left breast invasive ductal carcinoma   Post-operative diagnosis: Same     Procedure: Bilateral simple mastectomy;  Bilateral axillary sentinel lymph node biopsy.  Left seed localized node excision     Surgeon: Dyan Vela MD   Assistant(s): Donald Leger PA-C  The physician s assistant was medically necessary for their expertise in retraction and suctioning     Anesthesia: General    Estimated blood loss:   25 cc     Specimens: ID Type Source Tests Collected by Time Destination   1 : LEFT BREAST, SHORT-SUPERIOR, LONG LATERAL Tissue Breast, Left SURGICAL PATHOLOGY EXAM Dyan Vela MD 7/16/2021  2:09 PM    2 : #1 LEFT AXILLARY SENTINEL LYMPH NODE AND SEED LOCALIZED Tissue Axilla, Left SURGICAL PATHOLOGY EXAM Dyan Vela MD 7/16/2021  2:18 PM    3 : #2 LEFT AXILLARY SENTINEL LYMPH NODE Tissue Axilla, Left SURGICAL PATHOLOGY EXAM Dyan Vela MD 7/16/2021  2:25 PM    4 : #3 LEFT AXILLARY SENTINEL LYMPH NODE Tissue Lymph Node(s), Axillary, Left SURGICAL PATHOLOGY EXAM Dyan Vela MD 7/16/2021  2:27 PM    5 : #4 LEFT AXILLARY SENTINEL LYMPH NODE Tissue Lymph Node(s), Axillary, Left SURGICAL PATHOLOGY EXAM Dyan Vela MD 7/16/2021  2:28 PM    6 : #1 RIGHT AXILLARY SENTINEL LYMPH NODE Tissue Lymph Node(s), Axillary, Right SURGICAL PATHOLOGY EXAM Dyan Vela MD 7/16/2021  2:40 PM    7 : #2 RIGHT AXILLARY SENTINEL LYMPH NODE Tissue Lymph Node(s), Axillary, Right SURGICAL PATHOLOGY EXAM Dyan Vela MD 7/16/2021  2:43 PM    8 : right breast suture short superior long lateral Tissue Breast, Right SURGICAL PATHOLOGY EXAM Dyan Vela MD 7/16/2021  3:15 PM           DESCRIPTION OF PROCEDURE: The patient was taken to the operating room and placed on the table in supine position. The bilateral chest, breast, and axilla were prepped and draped in standard sterile fashion. A reduction pattern incision on the left breast  was made. The incision was carried down into the subcutaneous tissue. Flaps were raised superiorly to the clavicle, medially to the lateral aspect of the sternum, inferiorly to the inframammary fold and laterally down to the chest wall, thereby completely excising the breast tissue. Hemostasis was maintained with electrocautery.The breast was then lifted from the pectoralis muscle including the pectoralis fascia using Bovie electrocautery. The breast was marked with a short suture superiorly and a long suture laterally. The breast was passed off the field as specimen. The field was irrigated with sterile saline and hemostasis assured.   We began our left axillary sentinel lymph node biopsy. The patient had been injected with a radionuclear pharmaceutical preoperatively.  We then used the Neoprobe to localize an area of increased activity in the axilla. We entered the axillary space. We identified the node with the seed first. This was excised and bioptics revealed the seed and the seed and the clip. This was sent to pathology for routine evaluation given it was previously biopsied and positive. We then localized the area of increased activity, and isolated several lymph nodes from surrounding tissues. There were 5 additional nodes which were sentinel. These were all sent for frozen section.  The remainder of the axilla was negative for blue dye, and there was minimal radioactivity. The frozen section analysis of the sentinel lymph node(s) revealed no evidence of malignancy.  We inspected the field carefully for hemostasis and irrigated with sterile saline. A moist laparotomy pad was placed.   We then directed our attention to the right breast. Again, reduction pattern incision on the left breast was made. The incision was carried down into the subcutaneous tissue. Flaps were raised superiorly to the clavicle, medially to the lateral aspect of the sternum, inferiorly to the inframammary fold and laterally down to the  chest wall, thereby completely excising the breast tissue. Hemostasis was maintained with electrocautery.The breast was then lifted from the pectoralis muscle including the pectoralis fascia using Bovie electrocautery. The breast was marked with a short suture superiorly and a long suture laterally. The breast was passed off the field as specimen.   Attention was turned to the right axilla. We then used the Neoprobe to localize an area of increased activity in the axilla. We entered the axillary space. Two lymph nodes were identified with increased uptake and sent to pathology for review. These nodes were benign. We again irrigated the chest and hemostasis was assured.   A 15 fr round MANDY was placed under the each of the flaps and brought out through a lateral stab incision. This was secured to the skin with a 2-0 nylon suture. Our skin incisions were aligned with widely spaced staples. A small amount of excess skin was excised on the right and sent to pathology with the breast tissue. Once we were satisfied, we closed the deep dermis with interrupted 3-0 vicryl suture and the skin with a running 4-0 Vicryl subcuticular suture. Steri-strips were applied. The patient tolerated the procedure well. Sponge and instrument counts were correct.   Dyan Vela MD

## 2021-07-16 NOTE — ANESTHESIA POSTPROCEDURE EVALUATION
Patient: January Odonnell    Procedure(s):  Bilateral mastectomies with bilateral sentinel lymph node biopsy  seed localized left axillary node excision    Diagnosis:Malignant neoplasm of upper-inner quadrant of left breast in female, estrogen receptor positive (H) [C50.212, Z17.0]  Diagnosis Additional Information: No value filed.    Anesthesia Type:  General    Note:  Disposition: Admission   Postop Pain Control: Uneventful            Sign Out: Well controlled pain   PONV: No   Neuro/Psych: Uneventful            Sign Out: Acceptable/Baseline neuro status   Airway/Respiratory: Uneventful            Sign Out: Acceptable/Baseline resp. status   CV/Hemodynamics: Uneventful            Sign Out: Acceptable CV status; No obvious hypovolemia; No obvious fluid overload   Other NRE: NONE   DID A NON-ROUTINE EVENT OCCUR? No           Last vitals:  Vitals:    07/16/21 1730 07/16/21 1744 07/16/21 1745   BP: 116/69  111/69   Pulse: 75  73   Resp: 20 15 10   Temp: 36.2  C (97.1  F)  36.1  C (97  F)   SpO2: 97% 97% 97%       Last vitals prior to Anesthesia Care Transfer:  CRNA VITALS  7/16/2021 1607 - 7/16/2021 1707      7/16/2021             SpO2:  95 %    Resp Rate (set):  10          Electronically Signed By: Cony Whyte MD  July 16, 2021  5:58 PM

## 2021-07-16 NOTE — ANESTHESIA CARE TRANSFER NOTE
Patient: January Odonnell    Procedure(s):  Bilateral mastectomies with bilateral sentinel lymph node biopsy  seed localized left axillary node excision    Diagnosis: Malignant neoplasm of upper-inner quadrant of left breast in female, estrogen receptor positive (H) [C50.212, Z17.0]  Diagnosis Additional Information: No value filed.    Anesthesia Type:   General     Note:    Oropharynx: oropharynx clear of all foreign objects and spontaneously breathing  Level of Consciousness: drowsy  Oxygen Supplementation: face mask  Level of Supplemental Oxygen (L/min / FiO2): 8  Independent Airway: airway patency satisfactory and stable  Dentition: dentition unchanged  Vital Signs Stable: post-procedure vital signs reviewed and stable  Report to RN Given: handoff report given  Patient transferred to: PACU    Handoff Report: Identifed the Patient, Identified the Reponsible Provider, Reviewed the pertinent medical history, Discussed the surgical course, Reviewed Intra-OP anesthesia mangement and issues during anesthesia, Set expectations for post-procedure period and Allowed opportunity for questions and acknowledgement of understanding      Vitals: (Last set prior to Anesthesia Care Transfer)  CRNA VITALS  7/16/2021 1607 - 7/16/2021 1643      7/16/2021             SpO2:  95 %    Resp Rate (set):  10        Electronically Signed By: CELENA Sauer CRNA  July 16, 2021  4:43 PM

## 2021-07-16 NOTE — PROGRESS NOTES
Pt rec'd into 2205 @ approx 1820, somnolent, VSS on 3Lnc, capno, incisions CDI, ace wrap in place, JPx2 w/serosang drainage, oriented to room, call light, POC, dinner ordered, ice water given, ice applied, comp stockings on. Awaiting husbands arrival. Will continue to monitor.

## 2021-07-16 NOTE — BRIEF OP NOTE
[facility]    Brief Operative Note    Pre-operative diagnosis: Malignant neoplasm of upper-inner quadrant of left breast in female, estrogen receptor positive (H) [C50.212, Z17.0]  Post-operative diagnosis Left Breast Cancer    Procedure: Procedure(s):  Bilateral mastectomies with bilateral sentinel lymph node biopsy  seed localized left axillary node excision     Surgeon: Surgeon(s) and Role:     * Dyan Vela MD - Primary     * Donald Leger PA-C - Assisting     Anesthesia: General   Estimated blood loss: 25 mL  Drains: Tony-[place] x2  Specimens:   ID Type Source Tests Collected by Time Destination   1 : LEFT BREAST, SHORT-SUPERIOR, LONG LATERAL Tissue Breast, Left SURGICAL PATHOLOGY EXAM Dyan Vela MD 7/16/2021  2:09 PM    2 : #1 LEFT AXILLARY SENTINEL LYMPH NODE AND SEED LOCALIZED Tissue Axilla, Left SURGICAL PATHOLOGY EXAM Dyan Vela MD 7/16/2021  2:18 PM    3 : #2 LEFT AXILLARY SENTINEL LYMPH NODE Tissue Axilla, Left SURGICAL PATHOLOGY EXAM Dyan Vela MD 7/16/2021  2:25 PM    4 : #3 LEFT AXILLARY SENTINEL LYMPH NODE Tissue Lymph Node(s), Axillary, Left SURGICAL PATHOLOGY EXAM Dyan Vela MD 7/16/2021  2:27 PM    5 : #4 LEFT AXILLARY SENTINEL LYMPH NODE Tissue Lymph Node(s), Axillary, Left SURGICAL PATHOLOGY EXAM Dyan Vela MD 7/16/2021  2:28 PM    6 : #1 RIGHT AXILLARY SENTINEL LYMPH NODE Tissue Lymph Node(s), Axillary, Right SURGICAL PATHOLOGY EXAM Dyan Vela MD 7/16/2021  2:40 PM    7 : #2 RIGHT AXILLARY SENTINEL LYMPH NODE Tissue Lymph Node(s), Axillary, Right SURGICAL PATHOLOGY EXAM Dyan Vela MD 7/16/2021  2:43 PM    8 : right breast suture short superior long lateral Tissue Breast, Right SURGICAL PATHOLOGY EXAM Dyan Vela MD 7/16/2021  3:15 PM      Findings:   None.  Intraoperative frozen section to all nodes Left and Right side were Negative.  However, known positive node on the Left side with seed not  sent for frozen; this was sent routine.    Complications: None.  Implants: * No implants in log *

## 2021-07-17 VITALS
BODY MASS INDEX: 29.92 KG/M2 | RESPIRATION RATE: 16 BRPM | HEIGHT: 62 IN | TEMPERATURE: 95.6 F | HEART RATE: 47 BPM | OXYGEN SATURATION: 99 % | SYSTOLIC BLOOD PRESSURE: 108 MMHG | DIASTOLIC BLOOD PRESSURE: 59 MMHG | WEIGHT: 162.6 LBS

## 2021-07-17 PROCEDURE — 258N000003 HC RX IP 258 OP 636: Performed by: PHYSICIAN ASSISTANT

## 2021-07-17 PROCEDURE — 250N000013 HC RX MED GY IP 250 OP 250 PS 637: Performed by: PHYSICIAN ASSISTANT

## 2021-07-17 RX ORDER — AMOXICILLIN 250 MG
1 CAPSULE ORAL 2 TIMES DAILY PRN
Qty: 6 TABLET | Refills: 0 | Status: SHIPPED | OUTPATIENT
Start: 2021-07-17 | End: 2021-07-30

## 2021-07-17 RX ORDER — OXYCODONE HYDROCHLORIDE 5 MG/1
5 TABLET ORAL EVERY 4 HOURS PRN
Qty: 8 TABLET | Refills: 0 | Status: SHIPPED | OUTPATIENT
Start: 2021-07-17 | End: 2021-07-30

## 2021-07-17 RX ORDER — CYCLOBENZAPRINE HCL 10 MG
10 TABLET ORAL 3 TIMES DAILY PRN
Qty: 6 TABLET | Refills: 0 | Status: SHIPPED | OUTPATIENT
Start: 2021-07-17 | End: 2021-07-21

## 2021-07-17 RX ADMIN — FLUTICASONE PROPIONATE 1 SPRAY: 50 SPRAY, METERED NASAL at 08:31

## 2021-07-17 RX ADMIN — CETIRIZINE HYDROCHLORIDE 10 MG: 10 TABLET, FILM COATED ORAL at 08:30

## 2021-07-17 RX ADMIN — SODIUM CHLORIDE, POTASSIUM CHLORIDE, SODIUM LACTATE AND CALCIUM CHLORIDE: 600; 310; 30; 20 INJECTION, SOLUTION INTRAVENOUS at 02:22

## 2021-07-17 RX ADMIN — ACETAMINOPHEN 650 MG: 325 TABLET, FILM COATED ORAL at 08:30

## 2021-07-17 RX ADMIN — ACETAMINOPHEN 650 MG: 325 TABLET, FILM COATED ORAL at 02:22

## 2021-07-17 NOTE — PLAN OF CARE
A&Ox4, VSS on 1Lnc, pain controlled w/oxy, incision's CDI, MANDY's patent to bulb suction, up to br w/A1, clear yellow, fermin full liquid diet, IVF infusing, education complete on care of MANDY drains/stripping/documentation. Will continue to monitor.

## 2021-07-17 NOTE — PROVIDER NOTIFICATION
MD Notification    Notified Person: MD    Notified Person Name: Dr. Hawley    Notification Date/Time: 2030, 7/16/21    Notification Interaction: Page    Purpose of Notification: Obs 5 is wondering if she is supposed to have PRN muscle relaxers order for tonight, thank you!    Orders Received:    Comments:

## 2021-07-17 NOTE — DISCHARGE SUMMARY
[facility] Discharge Summary    January Odonnell MRN# [id number]   Age: 50 year old YOB: 1970     Date of Admission:  7/16/2021  Date of Discharge:  7/17/2021 12:19 PM  Admitting Provider:  Dyan Vela MD  Discharge Provider:  Donald Leger PA-C  Discharging Service: General Surgery     Primary Provider: Frieda Saldaña  Primary Care Physician Phone Number: [phone]          Admission Diagnoses:   Principle Diagnosis: Malignant neoplasm of upper-inner quadrant of left breast in female, estrogen receptor positive (H) [C50.212, Z17.0]  Secondary Diagnoses:          Discharge Diagnosis:   Malignant neoplasm of upper-inner quadrant of left breast in female, estrogen receptor positive (H) [C50.212, Z17.0]          Procedures:   Procedure(s): Bilateral simple mastectomy;  Bilateral axillary sentinel lymph node biopsy.  Left seed localized node excision            Discharge Medications:     Discharge Medication List as of 7/17/2021 10:22 AM      START taking these medications    Details   cyclobenzaprine (FLEXERIL) 10 MG tablet Take 1 tablet (10 mg) by mouth 3 times daily as needed for muscle spasms, Disp-6 tablet, R-0, E-Prescribe      oxyCODONE (ROXICODONE) 5 MG tablet Take 1 tablet (5 mg) by mouth every 4 hours as needed for moderate to severe pain, Disp-8 tablet, R-0, E-Prescribe      senna-docusate (SENOKOT-S/PERICOLACE) 8.6-50 MG tablet Take 1 tablet by mouth 2 times daily as needed for constipation, Disp-6 tablet, R-0, E-Prescribe         CONTINUE these medications which have NOT CHANGED    Details   cetirizine (ZYRTEC) 10 MG tablet Take 10 mg by mouth every morning , Historical      fluticasone (FLONASE) 50 MCG/ACT nasal spray Spray 1 spray into both nostrils every morning, Historical                 Allergies:         Allergies   Allergen Reactions     Ampicillin      Augmentin      Tetracycline      Amoxicillin Rash     Penicillins Rash             Brief History of Illness:    Reason  "for your hospital stay      Bilateral mastectomies         From Dr Vela's office note 6/29/21:  \"January Odonnell is a 50 year old female has newly diagnosed left breast cancer.  I reviewed the imaging and pathology reports with her and her mother and explained the findings.  We talked about the fact that this is invasive ductal carcinoma  that is large in size..   We discussed the receptor status as all receptors are currently pending. We will call as we get these results in. We discussed implications of various receptor scenarios. We discussed that breast cancer is treated in a multidisciplinary fashion and she has already met with Dr. Estrella. We discussed with her kiana positivity and size of cancer, I would likely favor neoadjuvant chemotherapy regardless of receptor status; however, if hormone positive, HER 2 negative and if MRI does not reveal extensive adenopathy, surgery first could be entertained. We discussed that we may elect to get an oncotype on the tumor to help with this decision.      We next discussed the surgical options for treatment.  I described the procedures for lumpectomy with sentinel lymph node biopsy and mastectomy with sentinel lymph node biopsy, possible axillary node dissection including the details of the procedures, the risks, anesthesia and expected recovery.  She is not a candidate for lumpectomy. We discussed the recommendation for axillary node dissection in someone with biopsy proven metastatic disease; however, if only 1 node of concern on MRI, would still do SLNB. Goal of neoadjuvant chemo is also to reduce need for extensive kiana dissection and make her a candidate for SLNB.         We talked about post-lumpectomy radiation, the course and usual side effects. We discussed that with lumpectomy, radiation is typically recommended to decrease risk of recurrence. It may be necessary following mastectomy depending on final pathology and if kiana involvement is present. I " "would recommend radiation for her following mastectomy due to size and kiana involvement.         We also talked about post-mastectomy reconstruction and the stages involved. We also discussed the various types of mastectomy, including total, skin-sparing, and nipple-sparing mastectomy.    The option of having immediate versus delayed reconstruction was also discussed.   We reviewed that the advantages of immediate reconstruction includes superior cosmetics, as the skin is preserved. She is not interested in reconstruction at this time.\"    After discussing the risks, benefits, and possible complications, informed consent was obtained and the patient underwent the above procedure.  There were no complications.  Please see the Operative Report for full details.           Hospital Course:   January Odonnell's hospital course was unremarkable.  She recovered as anticipated and experienced no post-operative complications. She had minimal pain POD1 and described as mostly soreness throughout the chest.    On the date of discharge, the patient was discharged to home in stable condition and afebrile.  She verbalized understanding of all discharge instructions and felt comfortable with the discharge plan.  She was asked to call with any further questions or concerns.         Condition on Discharge:      Discharge condition: Stable   Discharge vitals: Blood pressure 108/59, pulse (!) 47, temperature (!) 95.6  F (35.3  C), temperature source Oral, resp. rate 16, height 1.562 m (5' 1.5\"), weight 73.8 kg (162 lb 9.6 oz), SpO2 99 %.           Discharge Disposition:   Discharged to home          Discharge Instructions and Follow-Up:      January Odonnell was asked to follow up with surgical team in 1-2 weeks for drain removal and evaluation.  She will follow up with Dr. Estrella in ~1 week as well.      Donald Leger PA-C  Dictating on behalf of Dr. Dyan Vela  General Surgery  [facility]  [phone]     "

## 2021-07-17 NOTE — PROGRESS NOTES
A/Ox4. VSS on RA, can be minal at night. CAPNO looks good. SBA to BR, voiding well. Has some pain w/ activity but PRN Tylneol and oxy given. Ace wraps are CDI, ice packs in use. 2 MANDY drains w/ moderate output. Continue to monitor.

## 2021-07-17 NOTE — PROGRESS NOTES
May discharge when    Comments: Discharge to home when the following criteria have been met:     Stable vital signs met    Oral temperature < 100 o F met    Return to baseline mental status met    No bleeding at incision site met    Able to tolerate oral fluids met    Minimal pain reported and/or controlled with oral analgesics met    Minimal nausea met    Ambulates with assistance appropriate to age and health status met

## 2021-07-17 NOTE — PROGRESS NOTES
"General Surgery Note    Stable S/P Bilateral Mastectomies  POD1    -Discussed surgery and frozen pathology negative.  All questions answered  -ACE taken down and rewrapped looser.  There was some mild swelling upper lateral chest/axilla bilaterally.  -Drain instruction.  Supplies for discharge.  -Follow up with Dr. Vela in 1-2 weeks.  Drains out when less than 30mL per day x2days.  Pt know to call if this happens before her scheduled post op visit.  -Also plans to follow up with Dr. Estrella next week.  -Dispo: likely home this afternoon if pain controlled.      Doing pretty well.  Pain at lower sternum area.  Also, generalized soreness like punched in chest.  No difficulty breathing.  Denies UE numbness/tingling.  Tolerating diet.  Would like to discharge to home today if pain controlled.    NAD, very pleasant, A&O  /59 (BP Location: Right arm)   Pulse (!) 47   Temp (!) 95.6  F (35.3  C) (Oral)   Resp 16   Ht 1.562 m (5' 1.5\")   Wt 73.8 kg (162 lb 9.6 oz)   SpO2 99%   BMI 30.23 kg/m      Intake/Output Summary (Last 24 hours) at 7/17/2021 0949  Last data filed at 7/17/2021 0800  Gross per 24 hour   Intake 1250 ml   Output 205 ml   Net 1045 ml     Chest: no hematomas noted except some upper lateral chest swelling.  Skin: dressing clean and dry.  Visible skin has no erythema or discoloration.    MANDY: 50 and 55mL out total from surgery; liquid is serosanguinous    "

## 2021-07-17 NOTE — PROGRESS NOTES
Pt discharged to home via  at 1150 with all belongings, medications and supplies. Discharge and drain stripping instructions reviewed with teach back. PIV removed. No questions/comments/ concerns at this time.

## 2021-07-17 NOTE — DISCHARGE INSTRUCTIONS
[facility]  Discharge Instructions: Post-Operative Breast Surgery    ACTIVITY    Take frequent short walks and increase your activity gradually.      Avoid strenuous physical activity or heavy lifting greater than 15-20 lbs. for 1-2 weeks with arm on the surgery side.  You may climb stairs.    Gentle rotation and stretching of your arms and shoulders will prevent joint stiffness.    You may drive without restrictions when you are not using any prescription pain medication and feel comfortable in a car.    You may return to work/school when you are comfortable without any prescription pain medication.    WOUND CARE    You may remove your ACE wrap/dressing and shower 3 days after the surgery.  Pat your incisions dry and leave them open to air.  Re-apply dressing (Band-Aids or gauze/tape) as needed for drainage.    You may have steri-strips (looks like white tape) or skin glue on your incisions.  You may peel off the steri-strips 2 weeks after your surgery if they have not peeled off on their own.  If you have skin glue, it will peel up and fall off on its own.    Do not soak your incisions in a tub or pool for 2 weeks.     Do not apply any lotions, creams, or ointments to your incisions for 2 weeks.    A ridge under your incisions is normal and will gradually resolve.    Wear a supportive bra for 1-2 weeks, day and night.    Strip your drains few times daily to prevent these from clotting/clogging.    When the output of either drain is less than 30mL per day for 2 consecutive days, it is a good idea to call our office to have the drain(s) removed.  This may happen prior to your scheduled post op appointment with Dr. Vela.    DIET    Start with liquids, then gradually resume your regular diet as tolerated.     Drink plenty of liquids to stay hydrated.    PAIN    Expect some tenderness and discomfort at the incision site(s).  Use the prescribed pain medication at your discretion.   Expect gradual resolution of your pain over several days.    You may take ibuprofen with food (unless you have been told not to) or acetaminophen/Tylenol instead of or in addition to your prescribed pain medication.  If you are taking Norco or Percocet, do not take any additional acetaminophen/Tylenol.    Do not drink alcohol or drive while you are taking pain medications.    EXPECTATIONS    Pain medications can cause constipation.  Limit use when possible.  Take over the counter stool softener/stimulant, such as Colace or Senna, 1-2 times a day with plenty of water.  You may take a mild over the counter laxative, such as Miralax or a suppository, as needed.      You may discontinue these medications once you are having regular bowel movements and/or are no longer taking your narcotic pain medication.      RETURN APPOINTMENT    Follow up with your surgeon in 2 weeks.  Please call the office at [phone] to schedule your appointment.      CALL OUR OFFICE -[phone] IF YOU HAVE:     Chills or fever above 101 F.    Increased redness, warmth, or drainage at your incisions.    Significant bleeding.    Pain not relieved by your pain medication or rest.    Increasing pain after the first 48 hours.    Any other concerns or questions.           Revised September 2020

## 2021-07-19 ENCOUNTER — TELEPHONE (OUTPATIENT)
Dept: SURGERY | Facility: CLINIC | Age: 51
End: 2021-07-19

## 2021-07-19 NOTE — TELEPHONE ENCOUNTER
January is s/p bilateral mastectomies, bilateral SLNB on 7/16/2021 with Dr. Vela. January called today with questions related to showering.    January may remove the ace wrap 3 days after surgery and shower. After shower may leave incisions open to air. No need to apply ace wrap. Continue to monitor output in paulo drains. Call our office to schedule drain removal once paulo output is less than 30 mL/24 hours x 2 consecutive days.    January verbalized understanding.    Joan De La Torre RN, BSN, OCN, CBCN  Breast Nurse Navigator  [facility]  Phone: [phone]

## 2021-07-20 LAB
PATH REPORT.COMMENTS IMP SPEC: NORMAL
PATH REPORT.COMMENTS IMP SPEC: NORMAL
PATH REPORT.FINAL DX SPEC: NORMAL
PATH REPORT.GROSS SPEC: NORMAL
PATH REPORT.INTRAOP OBS SPEC DOC: NORMAL
PATH REPORT.MICROSCOPIC SPEC OTHER STN: NORMAL
PATH REPORT.RELEVANT HX SPEC: NORMAL
PATHOLOGY SYNOPTIC REPORT: NORMAL
PHOTO IMAGE: NORMAL

## 2021-07-20 PROCEDURE — 88331 PATH CONSLTJ SURG 1 BLK 1SPC: CPT | Mod: 26 | Performed by: PATHOLOGY

## 2021-07-20 PROCEDURE — 88307 TISSUE EXAM BY PATHOLOGIST: CPT | Mod: 26 | Performed by: PATHOLOGY

## 2021-07-20 PROCEDURE — 88309 TISSUE EXAM BY PATHOLOGIST: CPT | Mod: 26 | Performed by: PATHOLOGY

## 2021-07-20 PROCEDURE — 88332 PATH CONSLTJ SURG EA ADD BLK: CPT | Mod: 26 | Performed by: PATHOLOGY

## 2021-07-21 ENCOUNTER — TELEPHONE (OUTPATIENT)
Dept: SURGERY | Facility: CLINIC | Age: 51
End: 2021-07-21

## 2021-07-21 ENCOUNTER — TELEPHONE (OUTPATIENT)
Dept: SURGERY | Facility: PHYSICIAN GROUP | Age: 51
End: 2021-07-21

## 2021-07-21 DIAGNOSIS — C50.212 MALIGNANT NEOPLASM OF UPPER-INNER QUADRANT OF LEFT BREAST IN FEMALE, ESTROGEN RECEPTOR POSITIVE (H): ICD-10-CM

## 2021-07-21 DIAGNOSIS — Z17.0 MALIGNANT NEOPLASM OF UPPER-INNER QUADRANT OF LEFT BREAST IN FEMALE, ESTROGEN RECEPTOR POSITIVE (H): ICD-10-CM

## 2021-07-21 RX ORDER — CYCLOBENZAPRINE HCL 10 MG
10 TABLET ORAL 3 TIMES DAILY PRN
Qty: 10 TABLET | Refills: 0 | Status: SHIPPED | OUTPATIENT
Start: 2021-07-21 | End: 2021-08-20

## 2021-07-21 NOTE — TELEPHONE ENCOUNTER
I called January and discussed her pathology report. It revealed a 5.7cm invasive ductal carcinoma, grade 2, ER/PRpositive, HER 2 negative. Margins are negative. She had 2/5 sentinel nodes positive for metastatic disease (one 1.3cm macromet and the other a micromet). Right breast was benign and nodes on the right benign.  She is doing well. We will touch base with Dr. Estrella's team on ordering an oncotype. I will see her back next Friday for follow up as scheduled.     Dyan Vela MD  [facility]  [phone]

## 2021-07-21 NOTE — TELEPHONE ENCOUNTER
January is s/p bilateral simple mastectomies, bilateral axillary SLNB on 7/16/2021 with Dr. Vela. January is calling today requesting a refill of Flexeril.     January reports taking one tablet of Flexeril at . She is managing her pain throughout the day with Tylenol as needed and applying ice packs.     Will review refill request with Dr. Vela.     Joan De La Torre RN, BSN, OCN, CBCN  Breast Nurse Navigator  [facility]  Phone: [phone]

## 2021-07-26 ENCOUNTER — TELEPHONE (OUTPATIENT)
Dept: MAMMOGRAPHY | Facility: CLINIC | Age: 51
End: 2021-07-26

## 2021-07-26 ENCOUNTER — MEDICAL CORRESPONDENCE (OUTPATIENT)
Dept: HEALTH INFORMATION MANAGEMENT | Facility: CLINIC | Age: 51
End: 2021-07-26

## 2021-07-26 ENCOUNTER — TRANSFERRED RECORDS (OUTPATIENT)
Dept: HEALTH INFORMATION MANAGEMENT | Facility: CLINIC | Age: 51
End: 2021-07-26

## 2021-07-26 DIAGNOSIS — C50.212 MALIGNANT NEOPLASM OF UPPER-INNER QUADRANT OF LEFT FEMALE BREAST (H): ICD-10-CM

## 2021-07-26 DIAGNOSIS — Z01.818 EXAMINATION PRIOR TO CHEMOTHERAPY: Primary | ICD-10-CM

## 2021-07-26 NOTE — TELEPHONE ENCOUNTER
I discussed with Dr. Vela who would like output to be <30ml for each MANDY for 2 consecutive days, so recommends waiting until 7/30/21 to take patient's MANDY Drains out.      I called patient and informed of Dr. Vela's recommendations above.  Patient verbalized understanding and will be seen in clinic by Dr. Vela on 7/30/21 for post op/MANDY drain removal.      Kyung Montano, RN BSN  Breast Nurse Care Coordinator  [facility] [place]  [phone]

## 2021-07-26 NOTE — TELEPHONE ENCOUNTER
Laurie is calling from Dr. Estrella's office to inquire if patient can have her drains removed.    Patient is s/p Bilateral simple mastectomy; Bilateral axillary sentinel lymph node biopsy on 7/16/21 with Dr. Vela.    She is currently scheduled for a post op visit on 7/30/21.     Laurie states patient reports having following output:    7/26:  7.5ml - Left MANDY,  10ml - Rt. MANDY  7/25:  22 ml - Left MANDY,  26ml - Rt. MANDY  7/24:  30ml - Left MANDY,  40ml - Rt. MANDY      Informed caller I will check with Dr. Vela on whether okay to remove drains and then call patient back to inform.  Caller verbalized understanding and will inform patient of the plan of care.    Kyung Montano, RN, BSN

## 2021-07-29 NOTE — PROGRESS NOTES
[facility] Breast Surgery Postoperative Note    S: January is doing well after surgery. She has some soreness as expected on both sides and in the axilla. Her drain output has been low.     Breasts: bilateral mastectomy incisions are healing well. No erythema or induration. MANDY drains removed uneventfully. Small eschar at apex of reduction pattern incision on right - bacitracin applied.     Pathology:   Breast, left, mastectomy-Invasive ductal carcinoma, [place] grade 2 (see description)    B.  Lymph node, left axillary, sentinel, excision-Metastatic carcinoma consistent with breast primary to 1 of 1 lymph node    C.  Lymph node, left axillary, sentinel, excision-Metastatic carcinoma consistent with breast primary to 1 of 1 lymph node    D.  Lymph node, left axillary, sentinel, excision-Benign lymph node (1)    E.  Lymph nodes, left axillary, sentinel, excision-Benign lymph nodes (2)    F.  Lymph node, right axillary, sentinel, excision-Benign lymph node (1)    G.  Lymph node, right axillary, sentinel, excision-Benign lymph node (1)    H.  Breast, right, simple mastectomy-Fibrocystic change of proliferative type, no evidence of malignancy    A/P  January Odonnell is recovering from a bilateral simple mastectomies with bilateral sentinel lymph node biopsies on 7/16/2021 for a left breast 5.7cm invasive ductal carcinoma, grade 2, ER/UT positive, HER 2 negative with 2/5 SLNB positive for metastatic disease (1micromet, 1 macromet), stage [id number]. Her right breast was benign and right sentinel nodes negative (right nodes evalauted due to area of concern on breast MRI with no US correlate).     January has follow up with Dr. Estrella and oncotype was ordered and pending. I would like to see her back in 3 weeks. We discussed the role of radiation and she is scheduled to see Radiation oncology in [place]. She is feeling more tight and is concerned about possible lymphedema, PT orders placed.     Thank you for the  opportunity to help in her care.    Dyan Vela MD  [facility]  [phone]    Please route or send letter to:  Primary Care Provider (PCP) and Referring Provider

## 2021-07-30 ENCOUNTER — OFFICE VISIT (OUTPATIENT)
Dept: SURGERY | Facility: CLINIC | Age: 51
End: 2021-07-30
Payer: MEDICAID

## 2021-07-30 DIAGNOSIS — Z90.13 S/P BILATERAL MASTECTOMY: ICD-10-CM

## 2021-07-30 DIAGNOSIS — Z09 SURGERY FOLLOW-UP EXAMINATION: Primary | ICD-10-CM

## 2021-07-30 PROCEDURE — 99024 POSTOP FOLLOW-UP VISIT: CPT | Performed by: SURGERY

## 2021-08-04 ENCOUNTER — HOSPITAL ENCOUNTER (OUTPATIENT)
Dept: OCCUPATIONAL THERAPY | Facility: CLINIC | Age: 51
Setting detail: THERAPIES SERIES
End: 2021-08-04
Attending: SURGERY
Payer: COMMERCIAL

## 2021-08-04 DIAGNOSIS — Z90.13 S/P BILATERAL MASTECTOMY: ICD-10-CM

## 2021-08-04 PROCEDURE — 97535 SELF CARE MNGMENT TRAINING: CPT | Mod: GO

## 2021-08-04 PROCEDURE — 97165 OT EVAL LOW COMPLEX 30 MIN: CPT | Mod: GO

## 2021-08-04 NOTE — PROGRESS NOTES
08/04/21 1617   Quick Adds   Quick Adds Certification   Rehab Discipline   Discipline OT   Type of Visit   Type of visit Initial Edema Evaluation   General Information   Start of care 08/04/21   Referring physician Dyan Vela MD   Orders Evaluate and treat as indicated   Order date 07/30/21   Medical diagnosis s/p bilateral mastectomy   Onset of illness / date of surgery 07/16/21   Edema onset 07/16/21   Affected body parts Trunk   Edema etiology Cancer with lymph node dissection;Surgery   Location - Cancer with lymph node dissection B axilla, only positive on the L side   Edema etiology comments L breast cancer s/p bilateral mastectomies with SLNB.  Pt has slowly returned to activities but notes tightness at B shoulders and pectoral regions.  Pt awaiting oncotyping for progression with POC.   Pertinent history of current problem (PT: include personal factors and/or comorbidities that impact the POC; OT: include additional occupational profile info) unremarkable   Surgical / medical history reviewed Yes   Prior level of functional mobility ind   Community support Family / friend caregiver   Patient role / employment history Employed   Psychosocial concerns Isolation;Impaired body image   Living environment House / townhome   Living environment comments Pt staying with her mother.  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  Her  has returned to [facility].  Pt to finish up her cancer treatment before going back.     General observations Pt ind in clinic   Fall Risk Screen   Fall screen completed by OT   Have you fallen 2 or more times in the past year? No   Have you fallen and had an injury in the past year? No   Is patient a fall risk? No   Abuse Screen (yes response referral indicated)   Feels Unsafe at Home or Work/School no   Feels Threatened by Someone no   Does Anyone Try to Keep You From Having Contact with Others or Doing Things Outside Your Home? no   Physical Signs of Abuse Present  "no   System Outcome Measures   Lymphedema Life Impact Scale (score range 0-72). A higher score indicates greater impairment.   (n/a no swelling)   Functional Scales   Shoulder Pain and Disability Index (score out of 100). A higher score indicates greater impairment. 29   Subjective Report   Patient report of symptoms pain/tightness \"like a pulled muscle\" in both axilla/pec regions   Precautions   Precautions Active Cancer   Precautions comments removed - pt not yet completed radiation or chemo   Patient / Family Goals   Patient / family goals statement \"I want to learn about what to do to prevent this\"   Pain   Patient currently in pain Yes   Cognitive Status   Orientation Orientation to person, place and time   Level of consciousness Alert   Follows commands and answers questions 100% of the time   Edema Exam / Assessment   Skin condition Dryness   Skin condition comments Skin is diffusely dry - not year cleared to use lotion.  No visible cords   Scar Yes   Location bilateral mastectomies   Scar comments healing   Range of Motion   ROM comments Limited in B shoulders to 120*   Strength   Strength comments Pt not cleared for strengthening - generalized deconditioning   Activities of Daily Living   Activities of Daily Living ind but difficult with overhead reach   Bed Mobility   Bed mobility ind   Transfers   Transfers ind   Gait / Locomotion   Gait / Locomotion ind   Sensory   Sensory perception comments numbness in trunk   Planned Edema Interventions   Planned edema interventions Exercises;Precautions to prevent infection / exacerbation;Scar mobilization;Soft tissue mobilization;Myofascial release;Home management program development;Manual lymph drainage;Fit for compression garment   Clinical Impression   Criteria for skilled therapeutic intervention met Yes   Therapy diagnosis impaired functional ROM   Influenced by the following impairments / conditions Axillary Web Syndrome;Other  (s/p cancer surgery) "   Assessment of Occupational Performance 1-3 Performance Deficits   Identified Performance Deficits IADL, infection risk   Clinical Decision Making (Complexity) Low complexity   Treatment Frequency 1x/week   Treatment duration 8 weeks w/ follow up as needed   Patient / family and/or staff in agreement with plan of care Yes   Risks and benefits of therapy have been explained Yes   Clinical impression comments Pt presenting with tightness/decreased functional ROM affecting IADL participation.  Pt to benefit from skilled OT for management FREDERICK/soft tissue tightness in addition to increasing functional ROM   Education Assessment   Preferred learning style Listening;Reading;Demonstration;Pictures / video   Goals   Edema Eval Goals 1;2;3;4   Goal 1   Goal identifier ROM   Goal description In order to promote increased functional AROM for IADL participation, pt will demonstrate B shoulder flexion 0-160*   Target date 11/02/21   Goal 2   Goal identifier Pain   Goal description In order to promote improved ability to participate in IADL, pt will report no greater than 2/10 pain with overhead reach across 2 sessions   Target date 11/02/21   Goal 3   Goal identifier Home Management   Goal description In order to reduce risk of infection and promote ind with long term management of lymphedema, pt will verbalize understanding of skin care routine, precautions/contraindications, and when to seek further treatment following education   Target date 11/02/21   Goal 4   Goal identifier HEP   Goal description In order to promote fluid mobilization for increased ease of functional mobility and decreased infection risk, pt will demonstrate independence with self-MLD and/or exercises to facilitate the lymphatic system.   Target date 11/02/21   Total Evaluation Time   OT Eval, Low Complexity Minutes (85780) 30   Certification   Certification date from 08/04/21   Certification date to 11/02/21   Medical Diagnosis breast cancer s/p bilateral  mastectomy   Certification I certify the need for these services furnished under this plan of treatment and while under my care.  (Physician co-signature of this document indicates review and certification of the therapy plan).

## 2021-08-04 NOTE — PROGRESS NOTES
[facility]        OUTPATIENT OCCUPATIONAL THERAPY EDEMA EVALUATION  PLAN OF TREATMENT FOR OUTPATIENT REHABILITATION  (COMPLETE FOR INITIAL CLAIMS ONLY)  Patient's Last Name, First Name, January Santillan                           Provider s Name:   [facility] Medical Record No.  [id number]     Start of Care Date:  08/04/21   Onset Date:  07/16/21   Type:  OT   Medical Diagnosis:  breast cancer s/p bilateral mastectomy   Therapy Diagnosis:  impaired functional ROM Visits from SOC:  1                                     __________________________________________________________________________________   Plan of Treatment/Functional Goals:    Exercises, Precautions to prevent infection / exacerbation, Scar mobilization, Soft tissue mobilization, Myofascial release, Home management program development, Manual lymph drainage, Fit for compression garment        GOALS  1. Goal description: In order to promote increased functional AROM for IADL participation, pt will demonstrate B shoulder flexion 0-160*       Target date: 11/02/21  2. Goal description: In order to promote improved ability to participate in IADL, pt will report no greater than 2/10 pain with overhead reach across 2 sessions       Target date: 11/02/21  3. Goal description: In order to reduce risk of infection and promote ind with long term management of lymphedema, pt will verbalize understanding of skin care routine, precautions/contraindications, and when to seek further treatment following education       Target date: 11/02/21  4. Goal description: In order to promote fluid mobilization for increased ease of functional mobility and decreased infection risk, pt will demonstrate independence with self-MLD and/or exercises to facilitate the lymphatic system.       Target date: 11/02/21               Treatment Frequency: 1x/week   Treatment duration: 8 weeks w/ follow up as needed    Cassy Roberts MS, OTR/L, CLT                                    I CERTIFY THE NEED FOR THESE SERVICES FURNISHED UNDER        THIS PLAN OF TREATMENT AND WHILE UNDER MY CARE     (Physician co-signature of this document indicates review and certification of the therapy plan).                   Certification date from: 08/04/21       Certification date to: 11/02/21           Referring physician: Dyan Vela MD   Initial Assessment  See Epic Evaluation- Start of care: 08/04/21

## 2021-08-18 ENCOUNTER — TRANSFERRED RECORDS (OUTPATIENT)
Dept: HEALTH INFORMATION MANAGEMENT | Facility: CLINIC | Age: 51
End: 2021-08-18

## 2021-08-18 ENCOUNTER — LAB (OUTPATIENT)
Dept: LAB | Facility: CLINIC | Age: 51
End: 2021-08-18
Payer: COMMERCIAL

## 2021-08-19 ENCOUNTER — TRANSCRIBE ORDERS (OUTPATIENT)
Dept: OTHER | Age: 51
End: 2021-08-19

## 2021-08-19 DIAGNOSIS — C50.919 BREAST CANCER, FEMALE (H): Primary | ICD-10-CM

## 2021-08-20 ENCOUNTER — OFFICE VISIT (OUTPATIENT)
Dept: SURGERY | Facility: CLINIC | Age: 51
End: 2021-08-20
Payer: COMMERCIAL

## 2021-08-20 DIAGNOSIS — Z09 SURGERY FOLLOW-UP EXAMINATION: Primary | ICD-10-CM

## 2021-08-20 DIAGNOSIS — C50.912 HORMONE RECEPTOR POSITIVE BREAST CANCER, LEFT (H): ICD-10-CM

## 2021-08-20 PROCEDURE — 99024 POSTOP FOLLOW-UP VISIT: CPT | Performed by: SURGERY

## 2021-08-20 NOTE — PROGRESS NOTES
[facility] Breast Surgery Postoperative Note    S: January is doing well. She has no pain on her chest and is quite pleased with the appearance of her scars. She has some cording in the left axilla.     Breasts: bilateral mastectomy incisions are healing well. Mild cording in the left axilla.     A/P  January Odonnell is recovering from a bilateral simple mastectomies with bilateral sentinel lymph node biopsies on 7/16/2021 for a left breast 5.7cm invasive ductal carcinoma, grade 2, ER/KY positive, HER 2 negative with 2/5 SLNB positive for metastatic disease (1micromet, 1 macromet), stage [id number]. Her right breast was benign and right sentinel nodes negative (right nodes evalauted due to area of concern on breast MRI with no US correlate). Oncotype result was a 9! She had seen Dr. Estrella and thought he was reaching out to a few other MDs to weigh in on if she should have chemo. I discussed her care with Dr. Estrella following her appt and he was planning to have her see another provider as a 2nd opinion. His office will reach out to her to assist in scheduling. I will also present her at our next breast conference. I would typically see her back in 6 months for chest wall exam except she will likely be working abroad. I will plan to see her back next summer for chest wall exam.     Thank you for the opportunity to help in her care.    Dyan Vela MD  [facility]  [phone]    Please route or send letter to:  Primary Care Provider (PCP) and Referring Provider

## 2021-08-27 ENCOUNTER — HOSPITAL ENCOUNTER (OUTPATIENT)
Dept: OCCUPATIONAL THERAPY | Facility: CLINIC | Age: 51
Setting detail: THERAPIES SERIES
End: 2021-08-27
Attending: SURGERY
Payer: COMMERCIAL

## 2021-08-27 PROCEDURE — 97140 MANUAL THERAPY 1/> REGIONS: CPT | Mod: GO

## 2021-08-30 ENCOUNTER — ONCOLOGY VISIT (OUTPATIENT)
Dept: ONCOLOGY | Facility: CLINIC | Age: 51
End: 2021-08-30
Attending: INTERNAL MEDICINE
Payer: COMMERCIAL

## 2021-08-30 VITALS
HEART RATE: 71 BPM | RESPIRATION RATE: 16 BRPM | WEIGHT: 156.6 LBS | BODY MASS INDEX: 29.57 KG/M2 | TEMPERATURE: 98 F | SYSTOLIC BLOOD PRESSURE: 121 MMHG | HEIGHT: 61 IN | OXYGEN SATURATION: 100 % | DIASTOLIC BLOOD PRESSURE: 72 MMHG

## 2021-08-30 DIAGNOSIS — Z17.0 MALIGNANT NEOPLASM OF UPPER-INNER QUADRANT OF LEFT BREAST IN FEMALE, ESTROGEN RECEPTOR POSITIVE (H): ICD-10-CM

## 2021-08-30 DIAGNOSIS — C50.212 MALIGNANT NEOPLASM OF UPPER-INNER QUADRANT OF LEFT BREAST IN FEMALE, ESTROGEN RECEPTOR POSITIVE (H): ICD-10-CM

## 2021-08-30 PROCEDURE — 99205 OFFICE O/P NEW HI 60 MIN: CPT | Performed by: INTERNAL MEDICINE

## 2021-08-30 PROCEDURE — G0463 HOSPITAL OUTPT CLINIC VISIT: HCPCS

## 2021-08-30 RX ORDER — MELATONIN 5 MG
10 TABLET,CHEWABLE ORAL AT BEDTIME
COMMUNITY
End: 2022-07-08

## 2021-08-30 ASSESSMENT — PAIN SCALES - GENERAL: PAINLEVEL: NO PAIN (0)

## 2021-08-30 ASSESSMENT — MIFFLIN-ST. JEOR: SCORE: 1280.58

## 2021-08-30 NOTE — NURSING NOTE
"Oncology Rooming Note    August 30, 2021 1:10 PM   January Odonnell is a 50 year old female who presents for:    Chief Complaint   Patient presents with     Oncology Clinic Visit     Malignant neoplasm of upper-inner quadrant of left breast in female, estrogen receptor positive      Initial Vitals: /72   Pulse 71   Temp 98  F (36.7  C)   Resp 16   Ht 1.562 m (5' 1.5\")   Wt 71 kg (156 lb 9.6 oz)   SpO2 100%   BMI 29.11 kg/m   Estimated body mass index is 29.11 kg/m  as calculated from the following:    Height as of this encounter: 1.562 m (5' 1.5\").    Weight as of this encounter: 71 kg (156 lb 9.6 oz). Body surface area is 1.76 meters squared.  No Pain (0) Comment: Data Unavailable   No LMP recorded. (Menstrual status: Irregular Periods).  Allergies reviewed: Yes  Medications reviewed: Yes    Medications: Medication refills not needed today.  Pharmacy name entered into [facility]: [id number] [place] [facility] [place] [address] [facility]    Clinical concerns: 2nd opinion on treatment plan       Michel Moreira MA            "

## 2021-08-30 NOTE — LETTER
8/30/2021         RE: January Odonnell  [id number] [facility] [address]        Dear Colleague,    Thank you for referring your patient, January Odonnell, to the [facility]. Please see a copy of my visit note below.    Oncology Consult:    I was asked to see Ms. Odonnell at the request of Dr Noyola and Dr Dyan Vela regarding newly diagnosed breast cancer.    January Odonnell is a 50 year old female who is seen for evaluation of newly diagnosed left breast invasive ductal carcinoma, grade 2-3 with LVI at 7:00, 4cm FN measuring up to 9cm on imaging with left axillary node biopsied and positive for metastatic disease. Estrogen and progesterone positive, her2 negative.       She reports she felt a lump in her left lower inner breast in early June. 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 she saw an MD there who ordered US and biopsy which did reveal invasive ductal carcinoma. No receptors were evaluated. [place].      She then saw Dr. Estrella and had diagnostic imaging on 6/25/2021. Her imaging revealed a 2.1cm oval mass deep to the marker with smaller masses and architectrual distortion throughout the medial left breast with extent of 9cm. There is associated skin retraction and a prominent lymph node in the axilla. On US there were several oval hypoechoic masses with indistinct margins at 7:00, 4cm FN, largest measured 1.7cm and ill defined hypoechogenicity extending toward the nipple from the primary mass. Left axillary US revealed a 1.9cm enlarged lymph node. She had biopsy of the lesion in the breast at 7:00, 4cm FN and the axillary node.   Estrogen + Progesterone + her2 negative.  She had a follow up breast MRI that was reviewed demonstrating the mass and axillary involvement of the left breast.  There were also concerns about changes involving the right breast.  PETCT demonstrated no distant  "disease.    She underwent bilateral mastectomy with right SNLB and left node evaluation, no reconstruction.  Pathology demonstrated benign disease in the right breast.  Left breast with a 57 mm invasive carcinoma, grade 2.  2/5 lymph nodes with tumor - one with macrometastases and one with micrometastases.  Margins were negative.      She is recovering well.  She reports no pain.  She is anxious to know about next steps.  She is here with her mom today; her  returned to [facility].  No lymphedema.  No difficulty with ROM.      She is perimenopausal, and reports recent menstrual period, and then one again 3-4 months prior.     Hormonal history:  menarche 14, , no children, tried some infertility tx without success, no IVF completed,  Gabriella-menopausal, 4 years OCP use - had been on Juveil OCP and off at the time of diagnosis     Family history of breast cancer: Yes - paternal grandmother unknown age  Family history of ovarian cancer:  No  Family history of colon cancer: No  Family history of prostate cancer: Yes - maternal grandfather unknown age    Current Outpatient Medications   Medication     cetirizine (ZYRTEC) 10 MG tablet     fluticasone (FLONASE) 50 MCG/ACT nasal spray     Melatonin 5 MG CHEW     No current facility-administered medications for this visit.   Allergies reviewed in the EMR.    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  .  No tobacco use.  .    She underwent genetic testing which was unremarkable.    PE:  /72   Pulse 71   Temp 98  F (36.7  C)   Resp 16   Ht 1.562 m (5' 1.5\")   Wt 71 kg (156 lb 9.6 oz)   SpO2 100%   BMI 29.11 kg/m    Gen: well appearing NAD  HEENT: no icterus  NECK: supple  CV: rrr  Lungs: clear   Abd; soft, nt nd + bs  Ext: no edema  Chest: s/p bilateral mastectomy without reconstruction, no axillary adenopathy, incisions are c/d/i  No rashes    Reviewed outside pathology and breast imaging.    Final Diagnosis   A.  Breast, left, " mastectomy-Invasive ductal carcinoma, Onyx grade 2 (see description)    B.  Lymph node, left axillary, sentinel, excision-Metastatic carcinoma consistent with breast primary to 1 of 1 lymph node    C.  Lymph node, left axillary, sentinel, excision-Metastatic carcinoma consistent with breast primary to 1 of 1 lymph node    D.  Lymph node, left axillary, sentinel, excision-Benign lymph node (1)    E.  Lymph nodes, left axillary, sentinel, excision-Benign lymph nodes (2)    F.  Lymph node, right axillary, sentinel, excision-Benign lymph node (1)    G.  Lymph node, right axillary, sentinel, excision-Benign lymph node (1)    H.  Breast, right, simple mastectomy-Fibrocystic change of proliferative type, no evidence of malignancy            Electronically signed by Lance Wallis MD on 7/20/2021 at  5:00 PM   Synoptic Checklist   INVASIVE CARCINOMA OF THE BREAST: Resection  8th Edition - Protocol posted: 2/26/2020  INVASIVE CARCINOMA OF THE BREAST: COMPLETE EXCISION - All Specimens  SPECIMEN   Procedure  Total mastectomy    Specimen Laterality  Left    TUMOR   Tumor Site  Lower outer quadrant    Histologic Type  Invasive carcinoma of no special type (ductal)    Glandular (Acinar) / Tubular Differentiation  Score 3    Nuclear Pleomorphism  Score 2    Mitotic Rate  Score 1    Overall Grade  Grade 2 (scores of 6 or 7)    Tumor Size  Greatest dimension of largest invasive focus (Millimeters): 57 mm   Tumor Focality  Single focus of invasive carcinoma    Ductal Carcinoma In Situ (DCIS)  Present      Negative for extensive intraductal component (EIC)    Size (Extent) of DCIS  Cannot be determined: Multiple microscopic foci    Architectural Patterns  Cribriform      Solid    Nuclear Grade  Grade II (intermediate)    Necrosis  Not identified    Lobular Carcinoma In Situ (LCIS)  Not identified    Tumor Extent     Lymphovascular Invasion  Present    Dermal Lymphovascular Invasion  Not identified     Microcalcifications  Present in DCIS    Treatment Effect in the Breast  No known presurgical therapy    MARGINS   Invasive Carcinoma Margins  Uninvolved by invasive carcinoma    Distance from Closest Margin (Millimeters)  15 mm   Closest Margin(s)  Anterior      Inferior    DCIS Margins  Uninvolved by DCIS    Distance from Closest Margin (Millimeters)  15 mm   Closest Margin(s)  Anterior      Inferior    LYMPH NODES   Regional Lymph Nodes  Involved by tumor cells    Number of Lymph Nodes with Macrometastases (> 2 mm)  1    Number of Lymph Nodes with Micrometastases (> 0.2 mm to 2 mm and / or > 200 cells)  1    Size of Largest Metastatic Deposit (Millimeters)  13 mm   Extranodal Extension  Present    Extent of Extranodal Extension  Less than or equal to 2 mm    Total Number of Lymph Nodes Examined  5    Number of [place] Nodes Examined  5    PATHOLOGIC STAGE CLASSIFICATION (pTNM, AJCC 8th Edition)      Primary Tumor (pT)  pT3    Regional Lymph Nodes Modifier  (sn): [place] node(s) evaluated.    Regional Lymph Nodes (pN)  pN1a         PETCT reviewed demonstrating no distant disease    A/P:  51 y/o perimenopausal female with a T3N1a invasive carcinoma, ER + IL + her2 negative, with an oncotype Dx score of 9.      I had a long discussion with January and her mom about her overall disease.  We discussed that she has had complete surgical resection.  We reviewed that with her lymph node involvement, I would recommend adjuvant radiation therapy.      We spent the majority of our time reviewing her oncotype Dx testing and discussing this in detail.    We reviewed that with a low score of oncotype 9, her estimated risk of distant recurrence ten years from now is approximately 12%.  We reviewed the data from the RESPONDRx trial and the mINDACT studies suggesting that for patients with low genomic risk and higher clinical stage, there is no benefit from adjuvant chemotherapy.  If there is a small benefit, it is likely in that  the patient becomes menopausal.  As a result, we discussed moving forward with ovarian suppression plus an aromatase inhibitor.      Given she is in [facility], we discussed considering an oophorectomy and then starting an AI.  The side effects were discussed.      We reviewed the data about cardiac health in breast cancer survivors.  She asked about this, and I discussed the importance of heart health in breast cancer survivors based on the [facility] Breast cancer cohort, as well as recent studies published by [employer] and colleagues through the American [employer] Association.      I called Dr Noyola to update him on my recommendations.  He is out of the office today.    I also called Dr Saldaña, the patient's gynecologist but have not heard back yet.      It was a pleasure to see Ms. Odonnell.  I am happy to see her back anytime.    Peg Clement

## 2021-08-31 NOTE — PROGRESS NOTES
Oncology Consult:    I was asked to see Ms. Odonnell at the request of Dr Noyola and Dr Dyan Vela regarding newly diagnosed breast cancer.    January Odonnell is a 50 year old female who is seen for evaluation of newly diagnosed left breast invasive ductal carcinoma, grade 2-3 with LVI at 7:00, 4cm FN measuring up to 9cm on imaging with left axillary node biopsied and positive for metastatic disease. Estrogen and progesterone positive, her2 negative.       She reports she felt a lump in her left lower inner breast in early June. 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 she saw an MD there who ordered US and biopsy which did reveal invasive ductal carcinoma. No receptors were evaluated. [place].      She then saw Dr. Estrella and had diagnostic imaging on 6/25/2021. Her imaging revealed a 2.1cm oval mass deep to the marker with smaller masses and architectrual distortion throughout the medial left breast with extent of 9cm. There is associated skin retraction and a prominent lymph node in the axilla. On US there were several oval hypoechoic masses with indistinct margins at 7:00, 4cm FN, largest measured 1.7cm and ill defined hypoechogenicity extending toward the nipple from the primary mass. Left axillary US revealed a 1.9cm enlarged lymph node. She had biopsy of the lesion in the breast at 7:00, 4cm FN and the axillary node.   Estrogen + Progesterone + her2 negative.  She had a follow up breast MRI that was reviewed demonstrating the mass and axillary involvement of the left breast.  There were also concerns about changes involving the right breast.  PETCT demonstrated no distant disease.    She underwent bilateral mastectomy with right SNLB and left node evaluation, no reconstruction.  Pathology demonstrated benign disease in the right breast.  Left breast with a 57 mm invasive carcinoma, grade 2.  2/5 lymph nodes with tumor - one with  "macrometastases and one with micrometastases.  Margins were negative.      She is recovering well.  She reports no pain.  She is anxious to know about next steps.  She is here with her mom today; her  returned to [facility].  No lymphedema.  No difficulty with ROM.      She is perimenopausal, and reports recent menstrual period, and then one again 3-4 months prior.     Hormonal history:  menarche 14, , no children, tried some infertility tx without success, no IVF completed,  Gabriella-menopausal, 4 years OCP use - had been on Juveil OCP and off at the time of diagnosis     Family history of breast cancer: Yes - paternal grandmother unknown age  Family history of ovarian cancer:  No  Family history of colon cancer: No  Family history of prostate cancer: Yes - maternal grandfather unknown age    Current Outpatient Medications   Medication     cetirizine (ZYRTEC) 10 MG tablet     fluticasone (FLONASE) 50 MCG/ACT nasal spray     Melatonin 5 MG CHEW     No current facility-administered medications for this visit.   Allergies reviewed in the EMR.    SH:  LIves in [facility] and works as a teacher.  .  No tobacco use.  .    She underwent genetic testing which was unremarkable.    PE:  /72   Pulse 71   Temp 98  F (36.7  C)   Resp 16   Ht 1.562 m (5' 1.5\")   Wt 71 kg (156 lb 9.6 oz)   SpO2 100%   BMI 29.11 kg/m    Gen: well appearing NAD  HEENT: no icterus  NECK: supple  CV: rrr  Lungs: clear   Abd; soft, nt nd + bs  Ext: no edema  Chest: s/p bilateral mastectomy without reconstruction, no axillary adenopathy, incisions are c/d/i  No rashes    Reviewed outside pathology and breast imaging.    Final Diagnosis   A.  Breast, left, mastectomy-Invasive ductal carcinoma, Trevon grade 2 (see description)    B.  Lymph node, left axillary, sentinel, excision-Metastatic carcinoma consistent with breast primary to 1 of 1 lymph node    C.  Lymph node, left axillary, sentinel, excision-Metastatic carcinoma " consistent with breast primary to 1 of 1 lymph node    D.  Lymph node, left axillary, sentinel, excision-Benign lymph node (1)    E.  Lymph nodes, left axillary, sentinel, excision-Benign lymph nodes (2)    F.  Lymph node, right axillary, sentinel, excision-Benign lymph node (1)    G.  Lymph node, right axillary, sentinel, excision-Benign lymph node (1)    H.  Breast, right, simple mastectomy-Fibrocystic change of proliferative type, no evidence of malignancy            Electronically signed by Lance Wallis MD on 7/20/2021 at  5:00 PM   Synoptic Checklist   INVASIVE CARCINOMA OF THE BREAST: Resection  8th Edition - Protocol posted: 2/26/2020  INVASIVE CARCINOMA OF THE BREAST: COMPLETE EXCISION - All Specimens  SPECIMEN   Procedure  Total mastectomy    Specimen Laterality  Left    TUMOR   Tumor Site  Lower outer quadrant    Histologic Type  Invasive carcinoma of no special type (ductal)    Glandular (Acinar) / Tubular Differentiation  Score 3    Nuclear Pleomorphism  Score 2    Mitotic Rate  Score 1    Overall Grade  Grade 2 (scores of 6 or 7)    Tumor Size  Greatest dimension of largest invasive focus (Millimeters): 57 mm   Tumor Focality  Single focus of invasive carcinoma    Ductal Carcinoma In Situ (DCIS)  Present      Negative for extensive intraductal component (EIC)    Size (Extent) of DCIS  Cannot be determined: Multiple microscopic foci    Architectural Patterns  Cribriform      Solid    Nuclear Grade  Grade II (intermediate)    Necrosis  Not identified    Lobular Carcinoma In Situ (LCIS)  Not identified    Tumor Extent     Lymphovascular Invasion  Present    Dermal Lymphovascular Invasion  Not identified    Microcalcifications  Present in DCIS    Treatment Effect in the Breast  No known presurgical therapy    MARGINS   Invasive Carcinoma Margins  Uninvolved by invasive carcinoma    Distance from Closest Margin (Millimeters)  15 mm   Closest Margin(s)  Anterior      Inferior    DCIS Margins   Uninvolved by DCIS    Distance from Closest Margin (Millimeters)  15 mm   Closest Margin(s)  Anterior      Inferior    LYMPH NODES   Regional Lymph Nodes  Involved by tumor cells    Number of Lymph Nodes with Macrometastases (> 2 mm)  1    Number of Lymph Nodes with Micrometastases (> 0.2 mm to 2 mm and / or > 200 cells)  1    Size of Largest Metastatic Deposit (Millimeters)  13 mm   Extranodal Extension  Present    Extent of Extranodal Extension  Less than or equal to 2 mm    Total Number of Lymph Nodes Examined  5    Number of Williams Nodes Examined  5    PATHOLOGIC STAGE CLASSIFICATION (pTNM, AJCC 8th Edition)      Primary Tumor (pT)  pT3    Regional Lymph Nodes Modifier  (sn): Williams node(s) evaluated.    Regional Lymph Nodes (pN)  pN1a         PETCT reviewed demonstrating no distant disease    A/P:  51 y/o perimenopausal female with a T3N1a invasive carcinoma, ER + OR + her2 negative, with an oncotype Dx score of 9.      I had a long discussion with January and her mom about her overall disease.  We discussed that she has had complete surgical resection.  We reviewed that with her lymph node involvement, I would recommend adjuvant radiation therapy.      We spent the majority of our time reviewing her oncotype Dx testing and discussing this in detail.    We reviewed that with a low score of oncotype 9, her estimated risk of distant recurrence ten years from now is approximately 12%.  We reviewed the data from the RESPONDRx trial and the mINDACT studies suggesting that for patients with low genomic risk and higher clinical stage, there is no benefit from adjuvant chemotherapy.  If there is a small benefit, it is likely in that the patient becomes menopausal.  As a result, we discussed moving forward with ovarian suppression plus an aromatase inhibitor.      Given she is in [facility], we discussed considering an oophorectomy and then starting an AI.  The side effects were discussed.      We reviewed the data about  cardiac health in breast cancer survivors.  She asked about this, and I discussed the importance of heart health in breast cancer survivors based on the [place] Breast cancer cohort, as well as recent studies published by Azra and colleagues through the American Heart Association.      I called Dr Noyola to update him on my recommendations.  He is out of the office today.    I also called Dr Saldaña, the patient's gynecologist but have not heard back yet.      It was a pleasure to see Ms. Odonnell.  I am happy to see her back anytime.    Peg Clement

## 2021-09-01 ENCOUNTER — TRANSFERRED RECORDS (OUTPATIENT)
Dept: HEALTH INFORMATION MANAGEMENT | Facility: CLINIC | Age: 51
End: 2021-09-01

## 2021-09-01 ENCOUNTER — HOSPITAL ENCOUNTER (OUTPATIENT)
Dept: OCCUPATIONAL THERAPY | Facility: CLINIC | Age: 51
Setting detail: THERAPIES SERIES
End: 2021-09-01
Attending: SURGERY
Payer: COMMERCIAL

## 2021-09-01 DIAGNOSIS — Z17.0 MALIGNANT NEOPLASM OF UPPER-INNER QUADRANT OF LEFT BREAST IN FEMALE, ESTROGEN RECEPTOR POSITIVE (H): Primary | ICD-10-CM

## 2021-09-01 DIAGNOSIS — C50.212 MALIGNANT NEOPLASM OF UPPER-INNER QUADRANT OF LEFT BREAST IN FEMALE, ESTROGEN RECEPTOR POSITIVE (H): Primary | ICD-10-CM

## 2021-09-01 PROCEDURE — 97140 MANUAL THERAPY 1/> REGIONS: CPT | Mod: GO | Performed by: OCCUPATIONAL THERAPIST

## 2021-09-01 PROCEDURE — 97110 THERAPEUTIC EXERCISES: CPT | Mod: GO | Performed by: OCCUPATIONAL THERAPIST

## 2021-09-05 ENCOUNTER — HEALTH MAINTENANCE LETTER (OUTPATIENT)
Age: 51
End: 2021-09-05

## 2021-09-10 ENCOUNTER — HOSPITAL ENCOUNTER (OUTPATIENT)
Dept: OCCUPATIONAL THERAPY | Facility: CLINIC | Age: 51
Setting detail: THERAPIES SERIES
End: 2021-09-10
Attending: SURGERY
Payer: COMMERCIAL

## 2021-09-10 PROCEDURE — 97140 MANUAL THERAPY 1/> REGIONS: CPT | Mod: GO | Performed by: OCCUPATIONAL THERAPIST

## 2021-09-14 ENCOUNTER — HOSPITAL ENCOUNTER (OUTPATIENT)
Dept: BONE DENSITY | Facility: CLINIC | Age: 51
Discharge: HOME | End: 2021-09-14
Attending: INTERNAL MEDICINE | Admitting: INTERNAL MEDICINE
Payer: COMMERCIAL

## 2021-09-14 ENCOUNTER — TRANSFERRED RECORDS (OUTPATIENT)
Dept: HEALTH INFORMATION MANAGEMENT | Facility: CLINIC | Age: 51
End: 2021-09-14
Payer: COMMERCIAL

## 2021-09-14 DIAGNOSIS — C50.919 BREAST CANCER (H): ICD-10-CM

## 2021-09-14 PROCEDURE — 77080 DXA BONE DENSITY AXIAL: CPT

## 2021-09-16 ENCOUNTER — LAB REQUISITION (OUTPATIENT)
Dept: LAB | Facility: CLINIC | Age: 51
End: 2021-09-16
Payer: COMMERCIAL

## 2021-09-16 PROCEDURE — 88305 TISSUE EXAM BY PATHOLOGIST: CPT | Performed by: PATHOLOGY

## 2021-09-16 PROCEDURE — 88305 TISSUE EXAM BY PATHOLOGIST: CPT | Mod: TC,ORL | Performed by: OBSTETRICS & GYNECOLOGY

## 2021-09-17 LAB
PATH REPORT.COMMENTS IMP SPEC: NORMAL
PATH REPORT.COMMENTS IMP SPEC: NORMAL
PATH REPORT.FINAL DX SPEC: NORMAL
PATH REPORT.GROSS SPEC: NORMAL
PATH REPORT.MICROSCOPIC SPEC OTHER STN: NORMAL
PATH REPORT.RELEVANT HX SPEC: NORMAL
PHOTO IMAGE: NORMAL

## 2021-09-17 PROCEDURE — 88305 TISSUE EXAM BY PATHOLOGIST: CPT | Mod: 26 | Performed by: PATHOLOGY

## 2021-09-30 ENCOUNTER — HOSPITAL ENCOUNTER (OUTPATIENT)
Dept: OCCUPATIONAL THERAPY | Facility: CLINIC | Age: 51
Setting detail: THERAPIES SERIES
End: 2021-09-30
Attending: SURGERY
Payer: COMMERCIAL

## 2021-09-30 ENCOUNTER — TELEPHONE (OUTPATIENT)
Dept: SURGERY | Facility: CLINIC | Age: 51
End: 2021-09-30

## 2021-09-30 DIAGNOSIS — Z17.0 MALIGNANT NEOPLASM OF UPPER-INNER QUADRANT OF LEFT BREAST IN FEMALE, ESTROGEN RECEPTOR POSITIVE (H): Primary | ICD-10-CM

## 2021-09-30 DIAGNOSIS — C50.212 MALIGNANT NEOPLASM OF UPPER-INNER QUADRANT OF LEFT BREAST IN FEMALE, ESTROGEN RECEPTOR POSITIVE (H): Primary | ICD-10-CM

## 2021-09-30 PROCEDURE — 97140 MANUAL THERAPY 1/> REGIONS: CPT | Mod: GO

## 2021-09-30 NOTE — TELEPHONE ENCOUNTER
RX is in patient's chart and has been verbally cosigned by Dr. shoemaker    Informed patient to call tomorrow if [facility] supply needs RX faxed or if they can access it from her chart    She will call with any issues    Nadege Tan, RN-BSN

## 2021-09-30 NOTE — TELEPHONE ENCOUNTER
Name of caller: Patient    Reason for Call:  Pt can get in to see custom fitter for compression bra etc. tomorrow at 3:30. Waiting for the prescription form that [facility]ust[facility] put in this morning  Please call back to let her know that it has been signed     Surgeon:  Dr. Vela    Recent Surgery:  Yes.    If yes, when & what type:  7/16/2021 BILATERAL MASTECTOMY      Best phone number to reach pt at is: [phone]  Ok to leave a message with medical info? Yes.    Pharmacy preferred (if calling for a refill): NA

## 2021-10-13 ENCOUNTER — HOSPITAL ENCOUNTER (OUTPATIENT)
Dept: OCCUPATIONAL THERAPY | Facility: CLINIC | Age: 51
Setting detail: THERAPIES SERIES
End: 2021-10-13
Attending: SURGERY
Payer: COMMERCIAL

## 2021-10-13 PROCEDURE — 97140 MANUAL THERAPY 1/> REGIONS: CPT | Mod: GO

## 2021-10-14 ENCOUNTER — TRANSFERRED RECORDS (OUTPATIENT)
Dept: HEALTH INFORMATION MANAGEMENT | Facility: CLINIC | Age: 51
End: 2021-10-14
Payer: COMMERCIAL

## 2021-12-09 NOTE — DISCHARGE SUMMARY
"[facility]    Outpatient Occupational Therapy Discharge Note  Patient: January Odonnell  : 1970    Beginning/End Dates of Reporting Period:  21 to 10/13/21    Referring Provider: Dyan Vela MD    Therapy Diagnosis: s/p breast cancer treatment    Client Self Report: \"I feel like everything is wonderful\"    Objective Measurements:             Objective Measure: Pain   Details: 0/10         Outcome Measures (most recent score):       Goals:   Goal Identifier ROM   Goal Description In order to promote increased functional AROM for IADL participation, pt will demonstrate B shoulder flexion 0-160*   Target Date 21   Date Met  10/13/21   Progress (detail required for progress note):       Goal Identifier Pain   Goal Description In order to promote improved ability to participate in IADL, pt will report no greater than 2/10 pain with overhead reach across 2 sessions   Target Date 21   Date Met  09/10/21   Progress (detail required for progress note): Pt reports no pain     Goal Identifier Home Management   Goal Description In order to reduce risk of infection and promote ind with long term management of lymphedema, pt will verbalize understanding of skin care routine, precautions/contraindications, and when to seek further treatment following education   Target Date 21   Date Met  10/13/21   Progress (detail required for progress note):       Goal Identifier HEP   Goal Description In order to promote fluid mobilization for increased ease of functional mobility and decreased infection risk, pt will demonstrate independence with self-MLD and/or exercises to facilitate the lymphatic system.   Target Date 21   Date Met  10/13/21   Progress (detail required for progress note):       Plan:  Discharge from therapy.    Discharge:    Reason for Discharge: Patient has met all " goals.    Equipment Issued: velcro garment for flare up, wraps, sleeve    Discharge Plan: Patient to continue home program.  Pt understanding of precautions and when to seek further treatment.

## 2022-06-27 ENCOUNTER — HOSPITAL ENCOUNTER (OUTPATIENT)
Dept: GENERAL RADIOLOGY | Facility: CLINIC | Age: 52
Discharge: HOME | End: 2022-06-27
Attending: OBSTETRICS & GYNECOLOGY | Admitting: OBSTETRICS & GYNECOLOGY
Payer: COMMERCIAL

## 2022-06-27 DIAGNOSIS — Z11.1 SCREENING FOR TUBERCULOSIS: ICD-10-CM

## 2022-06-27 PROCEDURE — 71046 X-RAY EXAM CHEST 2 VIEWS: CPT

## 2022-06-28 ENCOUNTER — TRANSFERRED RECORDS (OUTPATIENT)
Dept: HEALTH INFORMATION MANAGEMENT | Facility: CLINIC | Age: 52
End: 2022-06-28

## 2022-07-08 ENCOUNTER — OFFICE VISIT (OUTPATIENT)
Dept: SURGERY | Facility: CLINIC | Age: 52
End: 2022-07-08
Payer: COMMERCIAL

## 2022-07-08 VITALS
HEART RATE: 72 BPM | HEIGHT: 62 IN | BODY MASS INDEX: 27.79 KG/M2 | RESPIRATION RATE: 16 BRPM | OXYGEN SATURATION: 98 % | SYSTOLIC BLOOD PRESSURE: 116 MMHG | DIASTOLIC BLOOD PRESSURE: 70 MMHG | WEIGHT: 151 LBS

## 2022-07-08 DIAGNOSIS — Z90.13 S/P BILATERAL MASTECTOMY: Primary | ICD-10-CM

## 2022-07-08 PROCEDURE — 99212 OFFICE O/P EST SF 10 MIN: CPT | Performed by: SURGERY

## 2022-08-06 ENCOUNTER — HEALTH MAINTENANCE LETTER (OUTPATIENT)
Age: 52
End: 2022-08-06

## 2022-10-22 ENCOUNTER — HEALTH MAINTENANCE LETTER (OUTPATIENT)
Age: 52
End: 2022-10-22

## 2023-08-27 ENCOUNTER — HEALTH MAINTENANCE LETTER (OUTPATIENT)
Age: 53
End: 2023-08-27

## 2024-06-18 ENCOUNTER — HOSPITAL ENCOUNTER (OUTPATIENT)
Dept: BONE DENSITY | Facility: CLINIC | Age: 54
Discharge: HOME | End: 2024-06-18
Attending: INTERNAL MEDICINE | Admitting: INTERNAL MEDICINE
Payer: COMMERCIAL

## 2024-06-18 DIAGNOSIS — C50.212 MALIGNANT NEOPLASM OF UPPER-INNER QUADRANT OF LEFT FEMALE BREAST (H): ICD-10-CM

## 2024-06-18 DIAGNOSIS — Z79.811 LONG TERM CURRENT USE OF AROMATASE INHIBITOR: ICD-10-CM

## 2024-06-18 PROCEDURE — 77080 DXA BONE DENSITY AXIAL: CPT

## 2024-07-26 ENCOUNTER — LAB REQUISITION (OUTPATIENT)
Dept: LAB | Facility: CLINIC | Age: 54
End: 2024-07-26
Payer: COMMERCIAL

## 2024-08-20 LAB
BKR LAB AP ADD'L TEST STATUS: NORMAL
BKR PATH ADDL TEST FINAL COMMENTS: NORMAL

## 2024-09-05 NOTE — PROGRESS NOTES
[facility] Follow Up Note    CHIEF COMPLAINT:  H/o left breast cancer    HISTORY OF PRESENT ILLNESS:   January Odonnell is s/p bilateral simple mastectomies with bilateral sentinel lymph node biopsies on 7/16/2021 for a left breast 5.7cm invasive ductal carcinoma, grade 2, ER/AK positive, HER 2 negative with 2/5 SLNB positive for metastatic disease (1micromet, 1 macromet), stage [id number]. Her right breast was benign and right sentinel nodes negative (right nodes evalauted due to area of concern on breast MRI with no US correlate). Oncotype result was a 9. She originally followed with Dr. Estrella with [place] [facility]. She is now seeing Dr. Ho. She is on anastrozole and tolerating it well overall. She has no concerns on her chest. She has excellent ROM. She plans to travel to [facility] in August.         REVIEW OF SYSTEMS:  Constitutional:  Negative for chills, fatigue, fever and weight change.  Eyes:  Negative for blurred vision, eye pain and photophobia.  ENT:  Negative for hearing problems, ENT pain, congestion, rhinorrhea, epistaxis, hoarseness and dental problems.  Cardiovascular:  Negative for chest pain, palpitations, tachycardia, orthopnea and edema.  Respiratory:  Negative for cough, dyspnea and hemoptysis.  Gastrointestinal:  Negative for abdominal pain, heartburn, constipation, diarrhea and stool changes.  Musculoskeletal:  Negative for arthralgias, back pain and myalgias.  Integumentary/Breast:  See HPI.    Past Medical History:   Diagnosis Date     Allergic state      Anxiety      Breast cancer (H)        Past Surgical History:   Procedure Laterality Date     BIOPSY BREAST SEED LOCALIZATION Left 7/16/2021    Procedure: seed localized left axillary node excision;  Surgeon: Dyan Vela MD;  Location:  OR     ENT SURGERY      Tonsilectomy at age 6     GYN SURGERY      D&C     MASTECTOMY SIMPLE BILATERAL, SENTINEL NODE BILATERAL, COMBINED Bilateral 7/16/2021    Procedure:  "Bilateral mastectomies with bilateral sentinel lymph node biopsy;  Surgeon: Dyan Vela MD;  Location: [facility] OR     NASAL/SINUS POLYPECTOMY       ORTHOPEDIC SURGERY      broken arm at age 10       Family History   Problem Relation Age of Onset     Cerebrovascular Disease Mother      Thyroid Disease Mother      Coronary Artery Disease Father      Hypertension Father      Cerebrovascular Disease Father      Prostate Cancer Father      Prostate Cancer Maternal Grandfather      Hypertension Maternal Grandfather      Cerebrovascular Disease Paternal Grandmother      Colon Cancer Paternal Grandmother      Cancer Paternal Grandmother      Coronary Artery Disease Paternal Grandfather      Cancer Paternal Grandfather      Substance Abuse Sister        Social History     Tobacco Use     Smoking status: Never Smoker     Smokeless tobacco: Never Used   Substance Use Topics     Alcohol use: Yes     Comment: rarely       Patient Active Problem List   Diagnosis     Malignant neoplasm of upper-inner quadrant of left breast in female, estrogen receptor positive (H)     Allergies   Allergen Reactions     Ampicillin      Augmentin      Tetracycline      Amoxicillin Rash     Penicillins Rash     Current Outpatient Medications   Medication Sig Dispense Refill     cetirizine (ZYRTEC) 10 MG tablet Take 10 mg by mouth every morning        fluticasone (FLONASE) 50 MCG/ACT nasal spray Spray 1 spray into both nostrils every morning       Melatonin 5 MG CHEW Take 10 mg by mouth At Bedtime       Vitals: /70   Pulse 72   Resp 16   Ht 1.575 m (5' 2\")   Wt 68.5 kg (151 lb)   SpO2 98%   BMI 27.62 kg/m      BMI= Body mass index is 27.62 kg/m .    EXAM:  GENERAL: healthy, alert and no distress   BREAST:  The breasts are surgically absent. Reduction pattern simple mastectomy scars are well healed.  There are no masses on either chest wall. The skin is well healed.   There is no axillary or supraclavicular " lymphadenopathy.  CARDIOVASCULAR:  RRR  RESPIRATORY: nonlabored breathing  NECK: Neck supple. No adenopathy. Thyroid symmetric, normal size  SKIN: No suspicious lesions or rashes  LYMPH: Normal cervical lymph nodes      ASSESSMENT/PLAN:  January Odonnell is now one year s/p bilateral mastectomies with SLNB. She is doing well. No clinical evidence of recurrence. Chest exam is benign. She will continue with [id number] chest wall exam with her oncology team. She will see me prn going forward with any concerns.     Dyan Vela MD  [facility]  [phone]        Please route or send letter to:  Primary Care Provider (PCP) and Referring Provider     negative

## 2024-10-20 ENCOUNTER — HEALTH MAINTENANCE LETTER (OUTPATIENT)
Age: 54
End: 2024-10-20

## (undated) DEVICE — SUCTION CANISTER MEDIVAC LINER 3000ML W/LID 65651-530

## (undated) DEVICE — SOL WATER IRRIG 1000ML BOTTLE 2F7114

## (undated) DEVICE — DRAPE BREAST/CHEST 29420

## (undated) DEVICE — GLOVE PROTEXIS BLUE W/NEU-THERA 6.5  2D73EB65

## (undated) DEVICE — DRAPE SHEET REV FOLD 3/4 9349

## (undated) DEVICE — PREP SKIN SCRUB TRAY 4461A

## (undated) DEVICE — CLIP ETHICON LIGACLIP SM BLUE LT100

## (undated) DEVICE — PACK MINOR SBA15MIFSE

## (undated) DEVICE — SYR 10ML FINGER CONTROL W/O NDL 309695

## (undated) DEVICE — SU VICRYL 3-0 TIE 12X18" J904T

## (undated) DEVICE — PLUMEPEN PRO

## (undated) DEVICE — ESU PENCIL W/SMOKE EVAC CVPLP2000

## (undated) DEVICE — CLIP APPLIER LIGACLIP 11" MED SHORT 20 CT MSM20

## (undated) DEVICE — SOL NACL 0.9% IRRIG 1000ML BOTTLE 2F7124

## (undated) DEVICE — PAD CHUX UNDERPAD 23X24" 7136

## (undated) DEVICE — NDL 22GA 1.5"

## (undated) DEVICE — SLEEVE PROTECTIVE BREAST BIOPSY  GMSLV001-10

## (undated) DEVICE — ESU ELEC BLADE 2.75" COATED/INSULATED E1455

## (undated) DEVICE — SU MONOCRYL 4-0 PS-2 18" UND Y496G

## (undated) DEVICE — BLADE KNIFE SURG 15 371115

## (undated) DEVICE — SU SILK 2-0 FSL 18" 677G

## (undated) DEVICE — STPL SKIN 35W ROTATING HEAD PRW35

## (undated) DEVICE — NDL 19GA 1.5"

## (undated) DEVICE — SU VICRYL 3-0 SH 27" J316H

## (undated) DEVICE — STPL SKIN SUBCUTICULAR INSORB  2030

## (undated) DEVICE — BLADE KNIFE SURG 10 371110

## (undated) DEVICE — SPONGE LAP 18X18" X8435

## (undated) DEVICE — DRSG STERI STRIP 1/2X4" R1547

## (undated) DEVICE — DRSG GAUZE 4X4" 3033

## (undated) DEVICE — ESU PENCIL W/SMOKE EVAC NEPTUNE STRYKER 0703-046-000

## (undated) DEVICE — LINEN TOWEL PACK X5 5464

## (undated) DEVICE — GLOVE PROTEXIS MICRO 6.0  2D73PM60

## (undated) RX ORDER — HYDROMORPHONE HCL IN WATER/PF 6 MG/30 ML
PATIENT CONTROLLED ANALGESIA SYRINGE INTRAVENOUS
Status: DISPENSED
Start: 2021-07-16

## (undated) RX ORDER — DEXAMETHASONE SODIUM PHOSPHATE 4 MG/ML
INJECTION, SOLUTION INTRA-ARTICULAR; INTRALESIONAL; INTRAMUSCULAR; INTRAVENOUS; SOFT TISSUE
Status: DISPENSED
Start: 2021-07-16

## (undated) RX ORDER — FENTANYL CITRATE 50 UG/ML
INJECTION, SOLUTION INTRAMUSCULAR; INTRAVENOUS
Status: DISPENSED
Start: 2021-07-16

## (undated) RX ORDER — PROPOFOL 10 MG/ML
INJECTION, EMULSION INTRAVENOUS
Status: DISPENSED
Start: 2021-07-16

## (undated) RX ORDER — SCOLOPAMINE TRANSDERMAL SYSTEM 1 MG/1
PATCH, EXTENDED RELEASE TRANSDERMAL
Status: DISPENSED
Start: 2021-07-16

## (undated) RX ORDER — ONDANSETRON 2 MG/ML
INJECTION INTRAMUSCULAR; INTRAVENOUS
Status: DISPENSED
Start: 2021-07-16

## (undated) RX ORDER — LIDOCAINE HYDROCHLORIDE 20 MG/ML
INJECTION, SOLUTION EPIDURAL; INFILTRATION; INTRACAUDAL; PERINEURAL
Status: DISPENSED
Start: 2021-07-16

## (undated) RX ORDER — HYDROMORPHONE HYDROCHLORIDE 1 MG/ML
INJECTION, SOLUTION INTRAMUSCULAR; INTRAVENOUS; SUBCUTANEOUS
Status: DISPENSED
Start: 2021-07-16

## (undated) RX ORDER — CLINDAMYCIN PHOSPHATE 900 MG/50ML
INJECTION, SOLUTION INTRAVENOUS
Status: DISPENSED
Start: 2021-07-16